# Patient Record
Sex: FEMALE | Race: WHITE | NOT HISPANIC OR LATINO | Employment: FULL TIME | RURAL
[De-identification: names, ages, dates, MRNs, and addresses within clinical notes are randomized per-mention and may not be internally consistent; named-entity substitution may affect disease eponyms.]

---

## 2022-01-05 ENCOUNTER — OFFICE VISIT (OUTPATIENT)
Dept: PRIMARY CARE CLINIC | Facility: CLINIC | Age: 22
End: 2022-01-05
Payer: COMMERCIAL

## 2022-01-05 VITALS
HEART RATE: 100 BPM | TEMPERATURE: 98 F | DIASTOLIC BLOOD PRESSURE: 80 MMHG | RESPIRATION RATE: 18 BRPM | OXYGEN SATURATION: 98 % | SYSTOLIC BLOOD PRESSURE: 130 MMHG

## 2022-01-05 DIAGNOSIS — R68.83 CHILLS: ICD-10-CM

## 2022-01-05 DIAGNOSIS — J32.9 SINUSITIS, UNSPECIFIED CHRONICITY, UNSPECIFIED LOCATION: Primary | ICD-10-CM

## 2022-01-05 DIAGNOSIS — R05.9 COUGH: ICD-10-CM

## 2022-01-05 DIAGNOSIS — R51.9 NONINTRACTABLE HEADACHE, UNSPECIFIED CHRONICITY PATTERN, UNSPECIFIED HEADACHE TYPE: ICD-10-CM

## 2022-01-05 DIAGNOSIS — R52 BODY ACHES: ICD-10-CM

## 2022-01-05 DIAGNOSIS — R50.9 FEVER, UNSPECIFIED FEVER CAUSE: ICD-10-CM

## 2022-01-05 DIAGNOSIS — J06.9 UPPER RESPIRATORY TRACT INFECTION, UNSPECIFIED TYPE: ICD-10-CM

## 2022-01-05 LAB
CTP QC/QA: YES
FLUAV AG NPH QL: NEGATIVE
FLUBV AG NPH QL: NEGATIVE
SARS-COV-2 AG RESP QL IA.RAPID: NEGATIVE

## 2022-01-05 PROCEDURE — 3079F DIAST BP 80-89 MM HG: CPT | Mod: CPTII,,, | Performed by: NURSE PRACTITIONER

## 2022-01-05 PROCEDURE — 1160F PR REVIEW ALL MEDS BY PRESCRIBER/CLIN PHARMACIST DOCUMENTED: ICD-10-PCS | Mod: CPTII,,, | Performed by: NURSE PRACTITIONER

## 2022-01-05 PROCEDURE — 3075F SYST BP GE 130 - 139MM HG: CPT | Mod: CPTII,,, | Performed by: NURSE PRACTITIONER

## 2022-01-05 PROCEDURE — 99203 PR OFFICE/OUTPT VISIT, NEW, LEVL III, 30-44 MIN: ICD-10-PCS | Mod: ,,, | Performed by: NURSE PRACTITIONER

## 2022-01-05 PROCEDURE — 1159F PR MEDICATION LIST DOCUMENTED IN MEDICAL RECORD: ICD-10-PCS | Mod: CPTII,,, | Performed by: NURSE PRACTITIONER

## 2022-01-05 PROCEDURE — 87428 SARSCOV & INF VIR A&B AG IA: CPT | Mod: QW,,, | Performed by: NURSE PRACTITIONER

## 2022-01-05 PROCEDURE — 3075F PR MOST RECENT SYSTOLIC BLOOD PRESS GE 130-139MM HG: ICD-10-PCS | Mod: CPTII,,, | Performed by: NURSE PRACTITIONER

## 2022-01-05 PROCEDURE — 87428 POCT SARS-COV2 (COVID) WITH FLU ANTIGEN: ICD-10-PCS | Mod: QW,,, | Performed by: NURSE PRACTITIONER

## 2022-01-05 PROCEDURE — 99203 OFFICE O/P NEW LOW 30 MIN: CPT | Mod: ,,, | Performed by: NURSE PRACTITIONER

## 2022-01-05 PROCEDURE — 1160F RVW MEDS BY RX/DR IN RCRD: CPT | Mod: CPTII,,, | Performed by: NURSE PRACTITIONER

## 2022-01-05 PROCEDURE — 1159F MED LIST DOCD IN RCRD: CPT | Mod: CPTII,,, | Performed by: NURSE PRACTITIONER

## 2022-01-05 PROCEDURE — 3079F PR MOST RECENT DIASTOLIC BLOOD PRESSURE 80-89 MM HG: ICD-10-PCS | Mod: CPTII,,, | Performed by: NURSE PRACTITIONER

## 2022-01-05 RX ORDER — AMOXICILLIN 500 MG/1
500 CAPSULE ORAL EVERY 12 HOURS
Qty: 20 CAPSULE | Refills: 0 | Status: SHIPPED | OUTPATIENT
Start: 2022-01-05 | End: 2022-01-15

## 2022-01-05 RX ORDER — LORATADINE 10 MG/1
10 TABLET ORAL DAILY PRN
Qty: 30 TABLET | Refills: 0 | Status: SHIPPED | OUTPATIENT
Start: 2022-01-05 | End: 2023-10-11 | Stop reason: CLARIF

## 2022-01-05 RX ORDER — GUAIFENESIN/DEXTROMETHORPHAN 100-10MG/5
10 SYRUP ORAL EVERY 6 HOURS PRN
Qty: 120 ML | Refills: 1 | Status: SHIPPED | OUTPATIENT
Start: 2022-01-05 | End: 2024-02-15

## 2022-01-05 NOTE — PROGRESS NOTES
[facility]  Primary Care       PATIENT NAME: Cristin Webber   : 2000    AGE: 21 y.o. DATE: 2022    MRN: [id number]        Reason for Visit / Chief Complaint:  Headache, Cough, Fever, Fatigue, Generalized Body Aches, Chills, and sneezing     Subjective:     HPI: Patient complains of headache, body aches, chills, cough, sneezing, fever, runny nose; states symptoms started yesterday. Patient states she is 15 weeks pregnant; states she sees Sharda Olson CNM at [facility]         Review of Systems: Review of Systems   Constitutional: Positive for chills and fever. Negative for fatigue.   HENT: Positive for sneezing.    Respiratory: Negative for cough, chest tightness and shortness of breath.    Cardiovascular: Negative for chest pain.   Gastrointestinal: Negative for abdominal pain, constipation, diarrhea, nausea and vomiting.   Genitourinary: Negative for dysuria.   Musculoskeletal: Positive for myalgias. Negative for gait problem.   Skin: Negative for rash.   Neurological: Positive for headaches.          Review of patient's allergies indicates:   Allergen Reactions    Oxycodone         Med List:  No current outpatient medications on file prior to visit.     No current facility-administered medications on file prior to visit.       Medical/Social/Family History:  Past Medical History:   Diagnosis Date    Hypertension       Social History     Tobacco Use   Smoking Status Never Smoker   Smokeless Tobacco Never Used      Social History     Substance and Sexual Activity   Alcohol Use Not Currently       History reviewed. No pertinent family history.   History reviewed. No pertinent surgical history.     There is no immunization history on file for this patient.       Objective:      Vitals:    22 1643   BP: 130/80   BP Location: Right arm   Patient Position: Sitting   BP Method: Large (Manual)   Pulse: 100   Resp: 18   Temp: 98.4 °F (36.9 °C)   TempSrc: Oral   SpO2: 98%     There  is no height or weight on file to calculate BMI.     Physical Exam: Physical Exam  Vitals and nursing note reviewed.   Constitutional:       Appearance: Normal appearance.   HENT:      Head: Normocephalic.      Mouth/Throat:      Mouth: Mucous membranes are moist.   Eyes:      Extraocular Movements: Extraocular movements intact.      Conjunctiva/sclera: Conjunctivae normal.      Pupils: Pupils are equal, round, and reactive to light.   Cardiovascular:      Rate and Rhythm: Normal rate and regular rhythm.      Heart sounds: Normal heart sounds.   Pulmonary:      Effort: Pulmonary effort is normal. No respiratory distress.      Breath sounds: Normal breath sounds. No wheezing or rhonchi.   Abdominal:      General: Bowel sounds are normal.      Palpations: Abdomen is soft.   Musculoskeletal:         General: Normal range of motion.      Cervical back: Normal range of motion.   Skin:     General: Skin is warm and dry.   Neurological:      General: No focal deficit present.      Mental Status: She is alert and oriented to person, place, and time.      Gait: Gait normal.   Psychiatric:         Mood and Affect: Mood normal.         Behavior: Behavior normal.              Component      Latest Ref Rng & Units 1/5/2022   SARS Coronavirus 2 Antigen      Negative Negative   Rapid Influenza A Ag      Negative Negative   Rapid Influenza B Ag      Negative Negative    Acceptable       Yes     Assessment:          ICD-10-CM ICD-9-CM   1. Sinusitis, unspecified chronicity, unspecified location  J32.9 473.9   2. Nonintractable headache, unspecified chronicity pattern, unspecified headache type  R51.9 784.0   3. Body aches  R52 780.96   4. Fever, unspecified fever cause  R50.9 780.60   5. Cough  R05.9 786.2   6. Chills  R68.83 780.64   7. Upper respiratory tract infection, unspecified type  J06.9 465.9        Plan:       Sinusitis, unspecified chronicity, unspecified location  -     amoxicillin (AMOXIL) 500 MG capsule;  Take 1 capsule (500 mg total) by mouth every 12 (twelve) hours. for 10 days  Dispense: 20 capsule; Refill: 0  -     loratadine (CLARITIN) 10 mg tablet; Take 1 tablet (10 mg total) by mouth daily as needed for Allergies.  Dispense: 30 tablet; Refill: 0    Nonintractable headache, unspecified chronicity pattern, unspecified headache type  -     POCT SARS-COV2 (COVID) with Flu Antigen    Body aches  -     POCT SARS-COV2 (COVID) with Flu Antigen    Fever, unspecified fever cause  -     POCT SARS-COV2 (COVID) with Flu Antigen    Cough  -     POCT SARS-COV2 (COVID) with Flu Antigen  -     dextromethorphan-guaiFENesin  mg/5 ml (ROBITUSSIN-DM)  mg/5 mL liquid; Take 10 mLs by mouth every 6 (six) hours as needed (cough and congestion).  Dispense: 120 mL; Refill: 1    Chills  -     POCT SARS-COV2 (COVID) with Flu Antigen    Upper respiratory tract infection, unspecified type          New & refilled meds:  Requested Prescriptions     Signed Prescriptions Disp Refills    amoxicillin (AMOXIL) 500 MG capsule 20 capsule 0     Sig: Take 1 capsule (500 mg total) by mouth every 12 (twelve) hours. for 10 days    loratadine (CLARITIN) 10 mg tablet 30 tablet 0     Sig: Take 1 tablet (10 mg total) by mouth daily as needed for Allergies.    dextromethorphan-guaiFENesin  mg/5 ml (ROBITUSSIN-DM)  mg/5 mL liquid 120 mL 1     Sig: Take 10 mLs by mouth every 6 (six) hours as needed (cough and congestion).       Follow up if symptoms worsen or fail to improve.     Patient Instructions   Patient Education       Sinusitis in Adults   The Basics   Written by the doctors and editors at [facility]   What is sinusitis? -- Sinusitis is a condition that can cause a stuffy nose, pain in the face, and discharge (mucus) from the nose. The sinuses are hollow areas in the bones of the face (figure 1). They have a thin lining that normally makes a small amount of mucus. When this lining gets irritated or infected, it swells and makes  extra mucus. This causes symptoms.  Sinusitis can occur when a person gets sick with a cold. The germs causing the cold can also infect the sinuses. Many times, a person feels like their cold is getting better. But then they get sinusitis and begin to feel sick again.  What are the symptoms of sinusitis? -- Common symptoms of sinusitis include:  · Stuffy or blocked nose  · Thick white, yellow, or green discharge from the nose  · Pain in the teeth  · Pain or pressure in the face - This often feels worse when a person bends forward.  People with sinusitis can also have other symptoms that include:  · Fever  · Cough  · Trouble smelling  · Ear pressure or fullness  · Headache  · Bad breath  · Feeling tired  Most of the time, symptoms start to improve in 7 to 10 days.  Should I see a doctor or nurse? -- See your doctor or nurse if your symptoms last more than 10 days, or if your symptoms first get better but then get worse.  Rarely, sinusitis can lead to serious problems. See your doctor or nurse right away (do not wait 10 days) if you have:  · Fever higher than 102°F (38.9°C)  · Sudden and severe pain in the face and head  · Trouble seeing or seeing double  · Trouble thinking clearly  · Swelling or redness around one or both eyes  · A stiff neck  Is there anything I can do on my own to feel better? -- Yes. To reduce your symptoms, you can:  · Take an over-the-counter pain reliever to reduce the pain  · Rinse your nose and sinuses with salt water a few times a day - Ask your doctor or nurse about the best way to do this.  Your doctor might also prescribe a steroid nose spray to reduce the swelling in your nose. (These kinds of steroid nose sprays are safe to take, and do not contain the same steroids that some athletes take illegally.)  How is sinusitis treated? -- Most of the time, sinusitis does not need to be treated with antibiotic medicines. This is because most sinusitis is caused by viruses, not bacteria, and  "antibiotics do not kill viruses. In fact, even sinusitis caused by bacteria will usually get better on its own without antibiotics.   Some people with sinusitis do need treatment with antibiotics. If your symptoms have not improved after 10 days, ask your doctor if you should take antibiotics. Your doctor might recommend that you wait 1 more week to see if your symptoms improve. But if you have symptoms such as a fever or a lot of pain, they might prescribe antibiotics. It is important to follow your doctor's instructions about taking your antibiotics.  What if my symptoms do not get better? -- If your symptoms do not get better, talk with your doctor or nurse. They might order tests to figure out why you still have symptoms. These can include:  · CT scan or other imaging tests - Imaging tests create pictures of the inside of the body.  · A test to look inside the sinuses - For this test, a doctor puts a thin tube with a camera on the end into the nose and up into the sinuses.  Some people get a lot of sinus infections or have symptoms that last at least 3 months. These people can have a different type of sinusitis called "chronic sinusitis." Chronic sinusitis can be caused by different things. For example, some people have growths inside their nose or sinuses that are called "polyps." Other people have allergies that cause their symptoms.  Chronic sinusitis can be treated in different ways. If you have chronic sinusitis, talk with your doctor about which treatments are right for you.  All topics are updated as new evidence becomes available and our peer review process is complete.  This topic retrieved from [email or web address] on: Sep 21, 2021.  Topic 30108 Version 17.0  Release: 29.4.2 - C29.263  © 2021 UpToDate, Inc. and/or its affiliates. All rights reserved.  figure 1: Sinuses of the face     This drawing shows the sinuses of the face, from the side and front views.  Graphic 089258 Version 1.0    Consumer Information Use " and Disclaimer   This information is not specific medical advice and does not replace information you receive from your health care provider. This is only a brief summary of general information. It does NOT include all information about conditions, illnesses, injuries, tests, procedures, treatments, therapies, discharge instructions or life-style choices that may apply to you. You must talk with your health care provider for complete information about your health and treatment options. This information should not be used to decide whether or not to accept your health care provider's advice, instructions or recommendations. Only your health care provider has the knowledge and training to provide advice that is right for you. The use of this information is governed by the [employer] End User License Agreement, available at https://www.[employer]rell[employer]/en/solutions/[employer]/about/valerie.The use of test company content is governed by the test company Terms of Use. ©2021 UpToDate, Inc. All rights reserved.  Copyright   © 2021 UpToDate, Inc. and/or its affiliates. All rights reserved.           Signature: Calos Devries DNP, FNP-C

## 2022-01-05 NOTE — PATIENT INSTRUCTIONS
Patient Education       Sinusitis in Adults   The Basics   Written by the doctors and editors at [facility]   What is sinusitis? -- Sinusitis is a condition that can cause a stuffy nose, pain in the face, and discharge (mucus) from the nose. The sinuses are hollow areas in the bones of the face (figure 1). They have a thin lining that normally makes a small amount of mucus. When this lining gets irritated or infected, it swells and makes extra mucus. This causes symptoms.  Sinusitis can occur when a person gets sick with a cold. The germs causing the cold can also infect the sinuses. Many times, a person feels like their cold is getting better. But then they get sinusitis and begin to feel sick again.  What are the symptoms of sinusitis? -- Common symptoms of sinusitis include:  · Stuffy or blocked nose  · Thick white, yellow, or green discharge from the nose  · Pain in the teeth  · Pain or pressure in the face - This often feels worse when a person bends forward.  People with sinusitis can also have other symptoms that include:  · Fever  · Cough  · Trouble smelling  · Ear pressure or fullness  · Headache  · Bad breath  · Feeling tired  Most of the time, symptoms start to improve in 7 to 10 days.  Should I see a doctor or nurse? -- See your doctor or nurse if your symptoms last more than 10 days, or if your symptoms first get better but then get worse.  Rarely, sinusitis can lead to serious problems. See your doctor or nurse right away (do not wait 10 days) if you have:  · Fever higher than 102°F (38.9°C)  · Sudden and severe pain in the face and head  · Trouble seeing or seeing double  · Trouble thinking clearly  · Swelling or redness around one or both eyes  · A stiff neck  Is there anything I can do on my own to feel better? -- Yes. To reduce your symptoms, you can:  · Take an over-the-counter pain reliever to reduce the pain  · Rinse your nose and sinuses with salt water a few times a day - Ask your doctor or  "nurse about the best way to do this.  Your doctor might also prescribe a steroid nose spray to reduce the swelling in your nose. (These kinds of steroid nose sprays are safe to take, and do not contain the same steroids that some athletes take illegally.)  How is sinusitis treated? -- Most of the time, sinusitis does not need to be treated with antibiotic medicines. This is because most sinusitis is caused by viruses, not bacteria, and antibiotics do not kill viruses. In fact, even sinusitis caused by bacteria will usually get better on its own without antibiotics.   Some people with sinusitis do need treatment with antibiotics. If your symptoms have not improved after 10 days, ask your doctor if you should take antibiotics. Your doctor might recommend that you wait 1 more week to see if your symptoms improve. But if you have symptoms such as a fever or a lot of pain, they might prescribe antibiotics. It is important to follow your doctor's instructions about taking your antibiotics.  What if my symptoms do not get better? -- If your symptoms do not get better, talk with your doctor or nurse. They might order tests to figure out why you still have symptoms. These can include:  · CT scan or other imaging tests - Imaging tests create pictures of the inside of the body.  · A test to look inside the sinuses - For this test, a doctor puts a thin tube with a camera on the end into the nose and up into the sinuses.  Some people get a lot of sinus infections or have symptoms that last at least 3 months. These people can have a different type of sinusitis called "chronic sinusitis." Chronic sinusitis can be caused by different things. For example, some people have growths inside their nose or sinuses that are called "polyps." Other people have allergies that cause their symptoms.  Chronic sinusitis can be treated in different ways. If you have chronic sinusitis, talk with your doctor about which treatments are right for " you.  All topics are updated as new evidence becomes available and our peer review process is complete.  This topic retrieved from [employer] on: Sep 21, 2021.  Topic 71992 Version 17.0  Release: 29.4.2 - C29.263  © 2021 UpToDate, Inc. and/or its affiliates. All rights reserved.  figure 1: Sinuses of the face     This drawing shows the sinuses of the face, from the side and front views.  Graphic 877490 Version 1.0    Consumer Information Use and Disclaimer   This information is not specific medical advice and does not replace information you receive from your health care provider. This is only a brief summary of general information. It does NOT include all information about conditions, illnesses, injuries, tests, procedures, treatments, therapies, discharge instructions or life-style choices that may apply to you. You must talk with your health care provider for complete information about your health and treatment options. This information should not be used to decide whether or not to accept your health care provider's advice, instructions or recommendations. Only your health care provider has the knowledge and training to provide advice that is right for you. The use of this information is governed by the [employer] End User License Agreement, available at https://www.[employer]/en/solutions/[employer]/about/valerie.The use of [employer] content is governed by the [employer] Terms of Use. ©2021 UpToDate, Inc. All rights reserved.  Copyright   © 2021 UpToDate, Inc. and/or its affiliates. All rights reserved.

## 2022-01-26 ENCOUNTER — OFFICE VISIT (OUTPATIENT)
Dept: PRIMARY CARE CLINIC | Facility: CLINIC | Age: 22
End: 2022-01-26
Payer: COMMERCIAL

## 2022-01-26 VITALS
SYSTOLIC BLOOD PRESSURE: 108 MMHG | BODY MASS INDEX: 29.62 KG/M2 | TEMPERATURE: 98 F | RESPIRATION RATE: 18 BRPM | WEIGHT: 200 LBS | OXYGEN SATURATION: 98 % | HEART RATE: 104 BPM | HEIGHT: 69 IN | DIASTOLIC BLOOD PRESSURE: 76 MMHG

## 2022-01-26 DIAGNOSIS — R11.10 VOMITING, INTRACTABILITY OF VOMITING NOT SPECIFIED, PRESENCE OF NAUSEA NOT SPECIFIED, UNSPECIFIED VOMITING TYPE: ICD-10-CM

## 2022-01-26 DIAGNOSIS — J02.9 SORE THROAT: ICD-10-CM

## 2022-01-26 DIAGNOSIS — U07.1 COVID-19 VIRUS INFECTION: Primary | ICD-10-CM

## 2022-01-26 DIAGNOSIS — J32.9 SINUSITIS, UNSPECIFIED CHRONICITY, UNSPECIFIED LOCATION: ICD-10-CM

## 2022-01-26 DIAGNOSIS — R51.9 NONINTRACTABLE HEADACHE, UNSPECIFIED CHRONICITY PATTERN, UNSPECIFIED HEADACHE TYPE: ICD-10-CM

## 2022-01-26 DIAGNOSIS — R09.81 NASAL CONGESTION: ICD-10-CM

## 2022-01-26 DIAGNOSIS — R05.9 COUGH: ICD-10-CM

## 2022-01-26 DIAGNOSIS — R68.83 CHILLS: ICD-10-CM

## 2022-01-26 DIAGNOSIS — Z3A.17 17 WEEKS GESTATION OF PREGNANCY: ICD-10-CM

## 2022-01-26 LAB
CTP QC/QA: YES
CTP QC/QA: YES
S PYO RRNA THROAT QL PROBE: NEGATIVE
SARS-COV-2 AG RESP QL IA.RAPID: POSITIVE

## 2022-01-26 PROCEDURE — 3074F PR MOST RECENT SYSTOLIC BLOOD PRESSURE < 130 MM HG: ICD-10-PCS | Mod: CPTII,,, | Performed by: NURSE PRACTITIONER

## 2022-01-26 PROCEDURE — 1159F PR MEDICATION LIST DOCUMENTED IN MEDICAL RECORD: ICD-10-PCS | Mod: CPTII,,, | Performed by: NURSE PRACTITIONER

## 2022-01-26 PROCEDURE — 1159F MED LIST DOCD IN RCRD: CPT | Mod: CPTII,,, | Performed by: NURSE PRACTITIONER

## 2022-01-26 PROCEDURE — 3008F BODY MASS INDEX DOCD: CPT | Mod: CPTII,,, | Performed by: NURSE PRACTITIONER

## 2022-01-26 PROCEDURE — 3078F PR MOST RECENT DIASTOLIC BLOOD PRESSURE < 80 MM HG: ICD-10-PCS | Mod: CPTII,,, | Performed by: NURSE PRACTITIONER

## 2022-01-26 PROCEDURE — 99213 OFFICE O/P EST LOW 20 MIN: CPT | Mod: ,,, | Performed by: NURSE PRACTITIONER

## 2022-01-26 PROCEDURE — 87426 SARSCOV CORONAVIRUS AG IA: CPT | Mod: QW,,, | Performed by: NURSE PRACTITIONER

## 2022-01-26 PROCEDURE — 3074F SYST BP LT 130 MM HG: CPT | Mod: CPTII,,, | Performed by: NURSE PRACTITIONER

## 2022-01-26 PROCEDURE — 3078F DIAST BP <80 MM HG: CPT | Mod: CPTII,,, | Performed by: NURSE PRACTITIONER

## 2022-01-26 PROCEDURE — 1160F RVW MEDS BY RX/DR IN RCRD: CPT | Mod: CPTII,,, | Performed by: NURSE PRACTITIONER

## 2022-01-26 PROCEDURE — 87880 POCT RAPID STREP A: ICD-10-PCS | Mod: QW,,, | Performed by: NURSE PRACTITIONER

## 2022-01-26 PROCEDURE — 87426 SARS CORONAVIRUS 2 ANTIGEN POCT: ICD-10-PCS | Mod: QW,,, | Performed by: NURSE PRACTITIONER

## 2022-01-26 PROCEDURE — 1160F PR REVIEW ALL MEDS BY PRESCRIBER/CLIN PHARMACIST DOCUMENTED: ICD-10-PCS | Mod: CPTII,,, | Performed by: NURSE PRACTITIONER

## 2022-01-26 PROCEDURE — 87880 STREP A ASSAY W/OPTIC: CPT | Mod: QW,,, | Performed by: NURSE PRACTITIONER

## 2022-01-26 PROCEDURE — 3008F PR BODY MASS INDEX (BMI) DOCUMENTED: ICD-10-PCS | Mod: CPTII,,, | Performed by: NURSE PRACTITIONER

## 2022-01-26 PROCEDURE — 99213 PR OFFICE/OUTPT VISIT, EST, LEVL III, 20-29 MIN: ICD-10-PCS | Mod: ,,, | Performed by: NURSE PRACTITIONER

## 2022-01-26 RX ORDER — CHOLECALCIFEROL (VITAMIN D3) 50 MCG
1 TABLET ORAL DAILY
Qty: 14 TABLET | Refills: 0 | Status: SHIPPED | OUTPATIENT
Start: 2022-01-26 | End: 2022-02-09

## 2022-01-26 RX ORDER — ONDANSETRON 4 MG/1
4 TABLET, FILM COATED ORAL EVERY 8 HOURS PRN
Qty: 30 TABLET | Refills: 0 | Status: SHIPPED | OUTPATIENT
Start: 2022-01-26 | End: 2022-08-24

## 2022-01-26 RX ORDER — FAMOTIDINE 20 MG/1
20 TABLET, FILM COATED ORAL 2 TIMES DAILY
Qty: 28 TABLET | Refills: 0 | Status: SHIPPED | OUTPATIENT
Start: 2022-01-26 | End: 2023-10-11 | Stop reason: ALTCHOICE

## 2022-01-26 RX ORDER — LANOLIN ALCOHOL/MO/W.PET/CERES
500 CREAM (GRAM) TOPICAL
Qty: 42 CAPSULE | Refills: 0 | Status: SHIPPED | OUTPATIENT
Start: 2022-01-26 | End: 2022-02-09

## 2022-01-26 RX ORDER — AMOXICILLIN 500 MG/1
500 CAPSULE ORAL EVERY 12 HOURS
Qty: 20 CAPSULE | Refills: 0 | Status: SHIPPED | OUTPATIENT
Start: 2022-01-26 | End: 2022-02-05

## 2022-01-26 RX ORDER — CETIRIZINE HYDROCHLORIDE 10 MG/1
10 TABLET ORAL DAILY
Qty: 30 TABLET | Refills: 0 | Status: SHIPPED | OUTPATIENT
Start: 2022-01-26 | End: 2022-08-24 | Stop reason: SDUPTHER

## 2022-01-26 NOTE — PATIENT INSTRUCTIONS
"Patient Education       COVID-19 Overview   The Basics   Written by the doctors and editors at [employer]   View in ItalianView in Latvian PortugueseView in GermanView in JapaneseView in FrenchView in SpanishView video in Romanian   What is COVID-19?   COVID-19 stands for "coronavirus disease 2019." It is caused by a virus called SARS-CoV-2. The virus first appeared in late 2019 and quickly spread around the world.  What are the symptoms of COVID-19?   Symptoms usually start 4 or 5 days after a person is infected with the virus. But in some people, it can take up to 2 weeks for symptoms to appear. Some people never show symptoms at all.  When symptoms do happen, they can include:  · Fever  · Cough  · Trouble breathing  · Feeling tired  · Shaking chills  · Muscle aches  · Headache  · Sore throat  · Problems with sense of smell or taste  Some people have digestive problems like nausea or diarrhea. There have also been some reports of rashes or other skin symptoms. For example, some people with COVID-19 get reddish-purple spots on their fingers or toes. But it's not clear why or how often this happens.  For most people, symptoms will get better within a few weeks. But a small number of people get very sick and stop being able to breathe on their own. In severe cases, their organs stop working, which can lead to death.  Some people with COVID-19 continue to have some symptoms for weeks or months. This seems to be more likely in people who are sick enough to need to stay in the hospital. But this can also happen in people who did not get very sick. Doctors are still learning about the long-term effects of COVID-19.  While children can get COVID-19, they are less likely than adults to have severe symptoms. More information about COVID-19 and children is available separately. (See "Patient education: COVID-19 and children (The Basics)".)  Am I at risk for getting seriously ill?   It depends on your age and health. In some " "people, COVID-19 leads to serious problems like pneumonia, not getting enough oxygen, heart problems, or even death. This risk gets higher as people get older. It is also higher in people who have other health problems like serious heart disease, chronic kidney disease, type 2 diabetes, chronic obstructive pulmonary disease (COPD), sickle cell disease, or obesity. People who have a weak immune system for other reasons (for example, HIV infection or certain medicines), asthma, cystic fibrosis, type 1 diabetes, or high blood pressure might also be at higher risk for serious problems.  How is COVID-19 spread?   The virus that causes COVID-19 mainly spreads from person to person. This usually happens when an infected person coughs, sneezes, or talks near other people. The virus is passed through tiny particles from the infected person's lungs and airway. These particles can easily travel through the air to other people who are nearby. In some cases, like in indoor spaces where the same air keeps being blown around, virus in the particles might be able to spread to other people who are farther away.  The virus can be passed easily between people who live together. But it can also spread at gatherings where people are talking close together, shaking hands, hugging, sharing food, or even singing together. Eating at restaurants raises the risk of infection, since people tend to be close to each other and not covering their faces. Doctors also think it is possible to get infected if you touch a surface that has the virus on it and then touch your mouth, nose, or eyes. However, this is probably not very common.  A person can be infected, and spread the virus to others, even without having any symptoms.  Are there different variants of the virus that causes COVID-19?   Yes. Viruses constantly change or "mutate." When this happens, a new strain or "variant" can form. Most of the time, new variants do not change the way a virus " "works. But when a variant has changes in important parts of the virus, it can act differently.  Experts have discovered several new variants of the virus that causes COVID-19. Certain variants seem to spread more easily than the original virus. They might also make people sicker.  Experts are studying the different variants. This will help them better understand how far they have spread, whether they affect people differently, and how well different vaccines protect against them.  The more people who get vaccinated against COVID-19, the harder it will be for the virus to form new variants.  Is there a test for the virus that causes COVID-19?   Yes. If your doctor or nurse suspects you have COVID-19, they might take a swab from inside your nose or mouth for testing. In some cases, they might take a sample of your saliva. These tests can help your doctor figure out if you have COVID-19 or another illness.  There are 2 types of tests used to diagnose COVID-19:  · Molecular tests - These look for the genetic material from the virus. They are also called "nucleic acid tests." You can get a molecular test at a doctor's office, clinic, or pharmacy. There are also places that make these tests available for lots of people, often at drive-through locations. Depending on the lab, it can take up to several days to get test results back.  Molecular tests are the best way to know if a person has COVID-19. That's because they can detect even very low levels of virus in the body.  · Antigen tests - These look for proteins from the virus. They can give results faster than most molecular tests. You can do an antigen test at a doctor's office, clinic, pharmacy, or through some organizations that make testing available in other places. You can also do an antigen test at home.  Antigen tests are not as accurate as molecular tests. They are more likely to give "false negative" results. This is when the test comes back negative even " "though the person actually is infected. But antigen tests can still be useful in some situations, when results are needed quickly or a molecular test is not available. For example, if a person has early symptoms of COVID-19, an antigen test can be accurate enough to detect virus in their body. If a person gets an antigen test and the result is negative, a molecular test might be needed to confirm they do not have the virus in their body. This might be done if the person has symptoms or knows they were exposed the virus.  There is also a blood test that can show if a person has had COVID-19 in the past. This is called an "antibody" test. Antibody tests are generally not used on their own to diagnose COVID-19 or make decisions about care. But experts can use them to learn how many people in a certain area were infected without knowing it.  Can COVID-19 be prevented?   The best way to prevent COVID-19 is to get vaccinated. In the [place], the first vaccines became available in late 2020. People age 12 and older can get a vaccine.  If enough people get the vaccine, the virus will stop spreading so quickly. More information about COVID-19 vaccines, including what you can do after being vaccinated, is available separately. (See "Patient education: COVID-19 vaccines (The Basics)".)  Experts believe that vaccines will be one of the most important ways to control the COVID-19 pandemic. People who are fully vaccinated are at much lower risk of getting the virus.  If you are not yet vaccinated, there are other ways to help protect yourself and others:  · Practice "social distancing." It's most important to avoid contact with people who are sick. But social distancing also means staying at least 6 feet (about 2 meters) from anyone outside your household. That's because the virus can spread easily through close contact, and it's not always possible to know who is infected.  · Wear a face mask when you need to go be in " public around other people. This is mostly so that if you are infected, even if you don't have any symptoms, you are less likely to spread the infection to other people. It might also help protect you from others who could be infected. Make sure your mask covers your mouth and nose.  You can buy cloth masks and disposable (non-medical) masks in stores or online. Cloth masks work best if they have several layers of fabric. Your mask should fit snugly over your face with no gaps. You can improve the fit by using a mask with an adjustable nose wire, adjusting or knotting the ear loops to make it tighter, or wearing a cloth mask on top of a disposable mask.  When you take your mask off, make sure you do not touch your eyes, nose, or mouth. And wash your hands after you touch the mask. You can wash cloth masks with the rest of your laundry.  When you are outdoors and not around other people, you might not need to wear a mask. But it's important to know what the rules are in your area. The [place] Centers for Disease Control and Prevention (CDC) has more information about how to wear a face mask: www.cdc.gov/coronavirus/2019-ncov/prevent-getting-sick/about-face-coverings.html.  · Wash your hands with soap and water often. This is especially important after being out in public or touching surfaces that many other people also touch, like door handles or railings. The risk of getting infected by touching items like this is probably not very high. Still, it's a good idea to wash your hands often. This also helps protect you from other illnesses, like the flu or the common cold.  Make sure to rub your hands with soap for at least 20 seconds, cleaning your wrists, fingernails, and in between your fingers. Then rinse your hands and dry them with a paper towel you can throw away. If you are not near a sink, you can use a hand sanitizing gel to clean your hands. The gels with at least 60 percent alcohol work the best. But it  "is better to wash with soap and water if you can.  · Avoid touching your face, especially your mouth, nose, and eyes.  · Avoid or limit traveling if you can. Any form of travel, especially if you spend time in crowded places like airports, increases your risk of getting and spreading infection.  If you do need to travel, be sure to check whether there are any rules about COVID-19 in the area you are visiting. In the [place], some places require people to "self-quarantine" for some length of time if they are visiting (or returning) from another state. This means not going out in public or being around other people. The [place] also requires a negative COVID-19 test for anyone who enters, or returns to, the country. Many other countries have testing requirements for visiting, too. All of these rules are meant to help slow the spread of COVID-19.  Once you are fully vaccinated, you are much less likely to get the virus. "Fully vaccinated" means you have had all doses of the vaccine and it has been at least 2 weeks since the last dose. (If you had a single-dose vaccine, you are fully vaccinated 2 weeks after you get the shot.)  What should I do if I have symptoms?   If you have a fever, cough, trouble breathing, or other symptoms of COVID-19, call your doctor or nurse. They will ask about your symptoms. They might also ask about any recent travel and whether you have been around anyone who might have been infected. Then they can tell you if you should come in or go somewhere else to be tested.  If your symptoms are not severe, it is best to call before you go in. The staff can tell you what to do and whether you need to be seen in person. Many people with only mild symptoms should stay home and avoid other people until they get better. If you do need to go to the clinic or hospital, be sure to wear a mask. This helps protect other people. The staff might also have you wait someplace away from other " people.  If you are severely ill and need to go to the clinic or hospital right away, you should still call ahead if possible. This way the staff can care for you while taking steps to protect others. If you think you are having a medical emergency, call for an ambulance (in the [place] and [place], dial [email or web address]).  What if I feel fine but think I was exposed?   If you think you were in close contact with someone with COVID-19, what to do next depends on whether you have already had COVID-19 or gotten the vaccine:  · If you have not had COVID-19 or gotten the vaccine - You should get tested after a possible exposure, even if you don't have any symptoms. Call your doctor or nurse if you aren't sure where to get a test. Then self-quarantine at home and monitor yourself for symptoms. This means staying home as much as possible, and staying at least 6 feet (2 meters) away from other people in your home.  The safest thing to do after a possible exposure is to self-quarantine for 14 days. This can be challenging with work, school, or other responsibilities. Because of this, some public health departments might allow people to stop quarantining sooner, especially if they get a negative test. If you're not sure how long to quarantine for, contact your local public health office or ask your doctor or nurse.  · If you have had COVID-19 or gotten the vaccine - If you had COVID-19 within the last 3 months, you do not need to self-quarantine. If you had COVID-19 but it was more than 3 months ago, follow the steps above.  If you are fully vaccinated, you do not need to self-quarantine. But you should still get tested 3 to 5 days after you were in contact with the person who had COVID-19. Even though you are much less likely to get the infection after being vaccinated, it is still possible.  If you self-quarantine for less than 14 days, or if you do not need to self-quarantine, you should still monitor yourself for symptoms for the full 14  "days. If you start to have any symptoms, call your doctor or nurse right away. You should also be extra careful about wearing a mask and social distancing during this time.  How is COVID-19 treated?   Many people will be able to stay home while they get better. But people with serious symptoms or other health problems might need to go to the hospital.  · Mild illness - Mild illness means you might have symptoms like fever and cough, but you do not have trouble breathing. Most people with COVID-19 have mild illness and can rest at home until they get better. This usually takes about 2 weeks, but it's not the same for everyone.  If you are recovering from COVID-19, it's important to stay home and "self-isolate" until your doctor or nurse tells you it's safe to stop. Self-isolation means staying apart from other people, even the people you live with. When you can stop self-isolation will depend on how long it has been since you had symptoms, and in some cases, whether you have had a negative test (showing that the virus is no longer in your body).  · Severe illness - If you have more severe illness with trouble breathing, you might need to stay in the hospital, possibly in the intensive care unit (also called the "ICU"). While you are there, you will most likely be in a special isolation room. Only medical staff will be allowed in the room, and they will have to wear special gowns, gloves, masks, and eye protection.  The doctors and nurses can monitor and support your breathing and other body functions and make you as comfortable as possible. You might need extra oxygen to help you breathe easily. If you are having a very hard time breathing, you might need a breathing tube. The tube goes down your throat and into your lungs. It is connected to a machine to help you breathe, called a "ventilator."  Doctors are studying several possible treatments for COVID-19. In certain cases, they might recommend treatments called " ""monoclonal antibodies." These treatments seem to help some people who are at risk of getting severely ill.  Doctors also might recommend being part of a clinical trial. A clinical trial is a scientific study that tests new medicines to see how well they work. Do not try any new medicines or treatments without talking to a doctor.  What should I do if someone in my home has COVID-19?   If someone in your home has COVID-19, there are additional things you can do to protect yourself and others:  · Keep the sick person away from others - The sick person should stay in a separate room, and use a different bathroom if possible. They should also eat in their own room.  Experts also recommend that the person stay away from pets in the house until they are better.  · Have them wear a mask - The sick person should wear a mask when they are in the same room as other people. If they can't wear a mask, you can help protect yourself by covering your face when you are in the room with them.  · Wash hands - Wash your hands with soap and water often.  · Clean often - Here are some specific things that can help:  ? Wear disposable gloves when you clean. It's also a good idea to wear gloves when you have to touch the sick person's laundry, dishes, utensils, or trash. Wash your hands after removing your gloves.  ? Regularly clean things that are touched a lot. This includes counters, bedside tables, doorknobs, computers, phones, and bathroom surfaces.  ? Clean things in your home with soap and water, but also use disinfectants on appropriate surfaces. Some cleaning products work well to kill bacteria, but not viruses, so it's important to check labels. The [place] Environmental Protection Agency (EPA) has a list of products here: www.epa.gov/pesticide-registration/list-n-disinfectants-use-against-sars-cov-2.  What if I am pregnant?   More information about COVID-19 and pregnancy is available separately. (See "Patient education: " "COVID-19 and pregnancy (The Basics)".)  If you are pregnant and you have questions about COVID-19, talk to your doctor, nurse, or midwife. They can help.  What can I do to cope with stress and anxiety?   It's normal to feel anxious or worried about COVID-19. It's also normal to feel stressed, lonely, or tired of not being able to do your usual activities. You can take care of yourself by trying to:  · Take breaks from the news  · Get regular exercise and eat healthy foods  · Find activities that you enjoy and can do at home  · Stay in touch with your friends and family members  It might help to remember that by doing things like getting vaccinated and following local guidelines, you are helping to protect other people in your community.  Where can I go to learn more?   As we learn more about this virus, expert recommendations will continue to change. Check with your doctor or public health official to get the most updated information about how to protect yourself and others.  For information about COVID-19 in your area, you can call your local public health office. In the [place], this usually means your city or town's Board of Health. Many states also have a "hotline" phone number you can call.  You can find more information about COVID-19 at the following websites:  · [place] Centers for Disease Control and Prevention (CDC): www.cdc.gov/COVID19  · World Health Organization (WHO): www.who.int/emergencies/diseases/novel-coronavirus-2019  All topics are updated as new evidence becomes available and our peer review process is complete.  This topic retrieved from [employer] on: Oct 28, 2021.  Topic 707436 Version 67.0  Release: 29.4.2 - C29.263  © 2021 UpToDate, Inc[employer] and/or its affiliates. All rights reserved.  Consumer Information Use and Disclaimer   This information is not specific medical advice and does not replace information you receive from your health care provider. This is only a brief summary of " general information. It does NOT include all information about conditions, illnesses, injuries, tests, procedures, treatments, therapies, discharge instructions or life-style choices that may apply to you. You must talk with your health care provider for complete information about your health and treatment options. This information should not be used to decide whether or not to accept your health care provider's advice, instructions or recommendations. Only your health care provider has the knowledge and training to provide advice that is right for you. The use of this information is governed by the [employer] End User License Agreement, available at https://www.[employer]/en/solutions/Better Place/about/valerie.The use of [employer] content is governed by the [employer] Terms of Use. ©2021 UpToDate[employer] Inc. All rights reserved.  Copyright   © 2021 UpToDate, Inc. and/or its affiliates. All rights reserved.  Patient Education       Sinusitis in Adults   The Basics   Written by the doctors and editors at [facility]   What is sinusitis? -- Sinusitis is a condition that can cause a stuffy nose, pain in the face, and discharge (mucus) from the nose. The sinuses are hollow areas in the bones of the face (figure 1). They have a thin lining that normally makes a small amount of mucus. When this lining gets irritated or infected, it swells and makes extra mucus. This causes symptoms.  Sinusitis can occur when a person gets sick with a cold. The germs causing the cold can also infect the sinuses. Many times, a person feels like their cold is getting better. But then they get sinusitis and begin to feel sick again.  What are the symptoms of sinusitis? -- Common symptoms of sinusitis include:  · Stuffy or blocked nose  · Thick white, yellow, or green discharge from the nose  · Pain in the teeth  · Pain or pressure in the face - This often feels worse when a person bends forward.  People with sinusitis can also have other symptoms that  include:  · Fever  · Cough  · Trouble smelling  · Ear pressure or fullness  · Headache  · Bad breath  · Feeling tired  Most of the time, symptoms start to improve in 7 to 10 days.  Should I see a doctor or nurse? -- See your doctor or nurse if your symptoms last more than 10 days, or if your symptoms first get better but then get worse.  Rarely, sinusitis can lead to serious problems. See your doctor or nurse right away (do not wait 10 days) if you have:  · Fever higher than 102°F (38.9°C)  · Sudden and severe pain in the face and head  · Trouble seeing or seeing double  · Trouble thinking clearly  · Swelling or redness around one or both eyes  · A stiff neck  Is there anything I can do on my own to feel better? -- Yes. To reduce your symptoms, you can:  · Take an over-the-counter pain reliever to reduce the pain  · Rinse your nose and sinuses with salt water a few times a day - Ask your doctor or nurse about the best way to do this.  Your doctor might also prescribe a steroid nose spray to reduce the swelling in your nose. (These kinds of steroid nose sprays are safe to take, and do not contain the same steroids that some athletes take illegally.)  How is sinusitis treated? -- Most of the time, sinusitis does not need to be treated with antibiotic medicines. This is because most sinusitis is caused by viruses, not bacteria, and antibiotics do not kill viruses. In fact, even sinusitis caused by bacteria will usually get better on its own without antibiotics.   Some people with sinusitis do need treatment with antibiotics. If your symptoms have not improved after 10 days, ask your doctor if you should take antibiotics. Your doctor might recommend that you wait 1 more week to see if your symptoms improve. But if you have symptoms such as a fever or a lot of pain, they might prescribe antibiotics. It is important to follow your doctor's instructions about taking your antibiotics.  What if my symptoms do not get  "better? -- If your symptoms do not get better, talk with your doctor or nurse. They might order tests to figure out why you still have symptoms. These can include:  · CT scan or other imaging tests - Imaging tests create pictures of the inside of the body.  · A test to look inside the sinuses - For this test, a doctor puts a thin tube with a camera on the end into the nose and up into the sinuses.  Some people get a lot of sinus infections or have symptoms that last at least 3 months. These people can have a different type of sinusitis called "chronic sinusitis." Chronic sinusitis can be caused by different things. For example, some people have growths inside their nose or sinuses that are called "polyps." Other people have allergies that cause their symptoms.  Chronic sinusitis can be treated in different ways. If you have chronic sinusitis, talk with your doctor about which treatments are right for you.  All topics are updated as new evidence becomes available and our peer review process is complete.  This topic retrieved from [employer] on: Sep 21, 2021.  Topic 49772 Version 17.0  Release: 29.4.2 - C29.263  © 2021 UpToDate, Inc. and/or its affiliates. All rights reserved.  figure 1: Sinuses of the face     This drawing shows the sinuses of the face, from the side and front views.  Graphic 100719 Version 1.0    Consumer Information Use and Disclaimer   This information is not specific medical advice and does not replace information you receive from your health care provider. This is only a brief summary of general information. It does NOT include all information about conditions, illnesses, injuries, tests, procedures, treatments, therapies, discharge instructions or life-style choices that may apply to you. You must talk with your health care provider for complete information about your health and treatment options. This information should not be used to decide whether or not to accept your health care provider's " advice, instructions or recommendations. Only your health care provider has the knowledge and training to provide advice that is right for you. The use of this information is governed by the [employer] End User License Agreement, available at [email or web address]/[email or web address].The use of [employer] content is governed by the [employer] Terms of Use. ©2021 Up[employer]Date, Inc. All rights reserved.  Copyright   © 2021 UpToDate, Inc. and/or its affiliates. All rights reserved.

## 2022-01-26 NOTE — LETTER
January 26, 2022      [facility]  [address] [facility]  [place] [address]  Phone: [phone]  Fax: [phone]       Patient: Cristin Webber   YOB: 2000  Date of Visit: 01/26/2022    To Whom It May Concern:    Nichole Webber  was at [facility] on 01/26/2022. The patient may return to work/school on 01/31/2022 with no restrictions. If you have any questions or concerns, or if I can be of further assistance, please do not hesitate to contact me.    Sincerely,    [place]arkle [place] LPN

## 2022-01-26 NOTE — PROGRESS NOTES
[facility]  Primary Care       PATIENT NAME: Cristin Webber   : 2000    AGE: 21 y.o. DATE: 2022    MRN: [id number]        Reason for Visit / Chief Complaint:  Cough, Nasal Congestion, Sore Throat, Headache, Chills, and Emesis     Subjective:     HPI: Patient states she had a positive home covid test; states she call her OB/GYN and was instructed to come to clinic to be retested. Patient states she has sore throat, headache, body aches, chills, vomiting. Patient states she is 17 weeks pregnant.         Patient states she will get cough med recommend by her OB provider; states she has a list of meds she can take.          Review of Systems: Review of Systems   Constitutional: Positive for chills. Negative for fatigue and fever.   HENT: Positive for sore throat.    Respiratory: Negative for cough, chest tightness and shortness of breath.    Cardiovascular: Negative for chest pain.   Gastrointestinal: Positive for nausea and vomiting. Negative for abdominal pain, constipation and diarrhea.   Genitourinary: Negative for dysuria.   Musculoskeletal: Positive for myalgias. Negative for gait problem.   Skin: Negative for rash.   Neurological: Positive for headaches.          Review of patient's allergies indicates:   Allergen Reactions    Oxycodone         Med List:  Current Outpatient Medications on File Prior to Visit   Medication Sig Dispense Refill    dextromethorphan-guaiFENesin  mg/5 ml (ROBITUSSIN-DM)  mg/5 mL liquid Take 10 mLs by mouth every 6 (six) hours as needed (cough and congestion). 120 mL 1    loratadine (CLARITIN) 10 mg tablet Take 1 tablet (10 mg total) by mouth daily as needed for Allergies. 30 tablet 0     No current facility-administered medications on file prior to visit.       Medical/Social/Family History:  Past Medical History:   Diagnosis Date    Hypertension       Social History     Tobacco Use   Smoking Status Never Smoker   Smokeless Tobacco Never Used  "     Social History     Substance and Sexual Activity   Alcohol Use Not Currently       History reviewed. No pertinent family history.   History reviewed. No pertinent surgical history.     There is no immunization history on file for this patient.       Objective:      Vitals:    01/26/22 0920   BP: 108/76   BP Location: Right arm   Patient Position: Sitting   BP Method: Large (Manual)   Pulse: 104   Resp: 18   Temp: 97.9 °F (36.6 °C)   TempSrc: Oral   SpO2: 98%   Weight: 90.7 kg (200 lb)   Height: 5' 9" (1.753 m)     Body mass index is 29.53 kg/m².     Physical Exam: Physical Exam  Vitals and nursing note reviewed.   Constitutional:       Appearance: Normal appearance.   HENT:      Head: Normocephalic.      Comments: Maxillary and frontal sinus tenderness with palpation     Mouth/Throat:      Mouth: Mucous membranes are moist.   Eyes:      Pupils: Pupils are equal, round, and reactive to light.   Cardiovascular:      Rate and Rhythm: Normal rate and regular rhythm.      Heart sounds: Normal heart sounds.   Pulmonary:      Effort: Pulmonary effort is normal. No respiratory distress.      Breath sounds: Normal breath sounds. No wheezing or rhonchi.   Abdominal:      General: Bowel sounds are normal.      Palpations: Abdomen is soft.   Musculoskeletal:         General: Normal range of motion.      Cervical back: Normal range of motion.   Skin:     General: Skin is warm and dry.   Neurological:      General: No focal deficit present.      Mental Status: She is alert and oriented to person, place, and time.      Gait: Gait normal.   Psychiatric:         Mood and Affect: Mood normal.         Behavior: Behavior normal.          Component      Latest Ref Rng & Units 1/26/2022           9:28 AM   SARS Coronavirus 2 Antigen      Negative Positive (A)    Acceptable       Yes         Assessment:          ICD-10-CM ICD-9-CM   1. COVID-19 virus infection  U07.1 079.89   2. Nonintractable headache, unspecified " chronicity pattern, unspecified headache type  R51.9 784.0   3. Chills  R68.83 780.64   4. Sore throat  J02.9 462   5. Nasal congestion  R09.81 478.19   6. Vomiting, intractability of vomiting not specified, presence of nausea not specified, unspecified vomiting type  R11.10 787.03   7. Cough  R05.9 786.2   8. 17 weeks gestation of pregnancy  Z3A.17 V22.2   9. Sinusitis, unspecified chronicity, unspecified location  J32.9 473.9        Plan:       COVID-19 virus infection  -     zinc 50 mg Tab; Take 1 tablet by mouth once daily. for 14 days  Dispense: 14 each; Refill: 0  -     cholecalciferol, vitamin D3, (VITAMIN D3) 50 mcg (2,000 unit) Tab; Take 1 tablet (2,000 Units total) by mouth once daily. for 14 days  Dispense: 14 tablet; Refill: 0  -     ascorbic Acid (VITAMIN C) 500 mg CpSR; Take 1 capsule (500 mg total) by mouth 3 (three) times daily. for 14 days  Dispense: 42 capsule; Refill: 0  -     famotidine (PEPCID) 20 MG tablet; Take 1 tablet (20 mg total) by mouth 2 (two) times daily. for 14 days  Dispense: 28 tablet; Refill: 0    Nonintractable headache, unspecified chronicity pattern, unspecified headache type  -     SARS Coronavirus 2 Antigen, POCT    Chills  -     SARS Coronavirus 2 Antigen, POCT    Sore throat  -     SARS Coronavirus 2 Antigen, POCT  -     POCT rapid strep A    Nasal congestion  -     SARS Coronavirus 2 Antigen, POCT    Vomiting, intractability of vomiting not specified, presence of nausea not specified, unspecified vomiting type  -     SARS Coronavirus 2 Antigen, POCT  -     ondansetron (ZOFRAN) 4 MG tablet; Take 1 tablet (4 mg total) by mouth every 8 (eight) hours as needed for Nausea.  Dispense: 30 tablet; Refill: 0    Cough  -     SARS Coronavirus 2 Antigen, POCT    17 weeks gestation of pregnancy    Sinusitis, unspecified chronicity, unspecified location  -     amoxicillin (AMOXIL) 500 MG capsule; Take 1 capsule (500 mg total) by mouth every 12 (twelve) hours. for 10 days  Dispense: 20  "capsule; Refill: 0  -     cetirizine (ZYRTEC) 10 MG tablet; Take 1 tablet (10 mg total) by mouth once daily.  Dispense: 30 tablet; Refill: 0          New & refilled meds:  Requested Prescriptions     Signed Prescriptions Disp Refills    amoxicillin (AMOXIL) 500 MG capsule 20 capsule 0     Sig: Take 1 capsule (500 mg total) by mouth every 12 (twelve) hours. for 10 days    zinc 50 mg Tab 14 each 0     Sig: Take 1 tablet by mouth once daily. for 14 days    cholecalciferol, vitamin D3, (VITAMIN D3) 50 mcg (2,000 unit) Tab 14 tablet 0     Sig: Take 1 tablet (2,000 Units total) by mouth once daily. for 14 days    ascorbic Acid (VITAMIN C) 500 mg CpSR 42 capsule 0     Sig: Take 1 capsule (500 mg total) by mouth 3 (three) times daily. for 14 days    famotidine (PEPCID) 20 MG tablet 28 tablet 0     Sig: Take 1 tablet (20 mg total) by mouth 2 (two) times daily. for 14 days    ondansetron (ZOFRAN) 4 MG tablet 30 tablet 0     Sig: Take 1 tablet (4 mg total) by mouth every 8 (eight) hours as needed for Nausea.    cetirizine (ZYRTEC) 10 MG tablet 30 tablet 0     Sig: Take 1 tablet (10 mg total) by mouth once daily.       Follow up if symptoms worsen or fail to improve.     Patient Instructions   Patient Education       COVID-19 Overview   The Basics   Written by the doctors and editors at [facility]   View in ItalianView in [place] PortugueseView in GermanView in JapaneseView in FrenchView in SpanishView video in Bangladeshi   What is COVID-19?   COVID-19 stands for "coronavirus disease 2019." It is caused by a virus called SARS-CoV-2. The virus first appeared in late 2019 and quickly spread around the world.  What are the symptoms of COVID-19?   Symptoms usually start 4 or 5 days after a person is infected with the virus. But in some people, it can take up to 2 weeks for symptoms to appear. Some people never show symptoms at all.  When symptoms do happen, they can include:  · Fever  · Cough  · Trouble breathing  · Feeling " "tired  · Shaking chills  · Muscle aches  · Headache  · Sore throat  · Problems with sense of smell or taste  Some people have digestive problems like nausea or diarrhea. There have also been some reports of rashes or other skin symptoms. For example, some people with COVID-19 get reddish-purple spots on their fingers or toes. But it's not clear why or how often this happens.  For most people, symptoms will get better within a few weeks. But a small number of people get very sick and stop being able to breathe on their own. In severe cases, their organs stop working, which can lead to death.  Some people with COVID-19 continue to have some symptoms for weeks or months. This seems to be more likely in people who are sick enough to need to stay in the hospital. But this can also happen in people who did not get very sick. Doctors are still learning about the long-term effects of COVID-19.  While children can get COVID-19, they are less likely than adults to have severe symptoms. More information about COVID-19 and children is available separately. (See "Patient education: COVID-19 and children (The Basics)".)  Am I at risk for getting seriously ill?   It depends on your age and health. In some people, COVID-19 leads to serious problems like pneumonia, not getting enough oxygen, heart problems, or even death. This risk gets higher as people get older. It is also higher in people who have other health problems like serious heart disease, chronic kidney disease, type 2 diabetes, chronic obstructive pulmonary disease (COPD), sickle cell disease, or obesity. People who have a weak immune system for other reasons (for example, HIV infection or certain medicines), asthma, cystic fibrosis, type 1 diabetes, or high blood pressure might also be at higher risk for serious problems.  How is COVID-19 spread?   The virus that causes COVID-19 mainly spreads from person to person. This usually happens when an infected person coughs, " "sneezes, or talks near other people. The virus is passed through tiny particles from the infected person's lungs and airway. These particles can easily travel through the air to other people who are nearby. In some cases, like in indoor spaces where the same air keeps being blown around, virus in the particles might be able to spread to other people who are farther away.  The virus can be passed easily between people who live together. But it can also spread at gatherings where people are talking close together, shaking hands, hugging, sharing food, or even singing together. Eating at restaurants raises the risk of infection, since people tend to be close to each other and not covering their faces. Doctors also think it is possible to get infected if you touch a surface that has the virus on it and then touch your mouth, nose, or eyes. However, this is probably not very common.  A person can be infected, and spread the virus to others, even without having any symptoms.  Are there different variants of the virus that causes COVID-19?   Yes. Viruses constantly change or "mutate." When this happens, a new strain or "variant" can form. Most of the time, new variants do not change the way a virus works. But when a variant has changes in important parts of the virus, it can act differently.  Experts have discovered several new variants of the virus that causes COVID-19. Certain variants seem to spread more easily than the original virus. They might also make people sicker.  Experts are studying the different variants. This will help them better understand how far they have spread, whether they affect people differently, and how well different vaccines protect against them.  The more people who get vaccinated against COVID-19, the harder it will be for the virus to form new variants.  Is there a test for the virus that causes COVID-19?   Yes. If your doctor or nurse suspects you have COVID-19, they might take a swab from " "inside your nose or mouth for testing. In some cases, they might take a sample of your saliva. These tests can help your doctor figure out if you have COVID-19 or another illness.  There are 2 types of tests used to diagnose COVID-19:  · Molecular tests - These look for the genetic material from the virus. They are also called "nucleic acid tests." You can get a molecular test at a doctor's office, clinic, or pharmacy. There are also places that make these tests available for lots of people, often at drive-through locations. Depending on the lab, it can take up to several days to get test results back.  Molecular tests are the best way to know if a person has COVID-19. That's because they can detect even very low levels of virus in the body.  · Antigen tests - These look for proteins from the virus. They can give results faster than most molecular tests. You can do an antigen test at a doctor's office, clinic, pharmacy, or through some organizations that make testing available in other places. You can also do an antigen test at home.  Antigen tests are not as accurate as molecular tests. They are more likely to give "false negative" results. This is when the test comes back negative even though the person actually is infected. But antigen tests can still be useful in some situations, when results are needed quickly or a molecular test is not available. For example, if a person has early symptoms of COVID-19, an antigen test can be accurate enough to detect virus in their body. If a person gets an antigen test and the result is negative, a molecular test might be needed to confirm they do not have the virus in their body. This might be done if the person has symptoms or knows they were exposed the virus.  There is also a blood test that can show if a person has had COVID-19 in the past. This is called an "antibody" test. Antibody tests are generally not used on their own to diagnose COVID-19 or make decisions about " "care. But experts can use them to learn how many people in a certain area were infected without knowing it.  Can COVID-19 be prevented?   The best way to prevent COVID-19 is to get vaccinated. In the [place], the first vaccines became available in late 2020. People age 12 and older can get a vaccine.  If enough people get the vaccine, the virus will stop spreading so quickly. More information about COVID-19 vaccines, including what you can do after being vaccinated, is available separately. (See "Patient education: COVID-19 vaccines (The Basics)".)  Experts believe that vaccines will be one of the most important ways to control the COVID-19 pandemic. People who are fully vaccinated are at much lower risk of getting the virus.  If you are not yet vaccinated, there are other ways to help protect yourself and others:  · Practice "social distancing." It's most important to avoid contact with people who are sick. But social distancing also means staying at least 6 feet (about 2 meters) from anyone outside your household. That's because the virus can spread easily through close contact, and it's not always possible to know who is infected.  · Wear a face mask when you need to go be in public around other people. This is mostly so that if you are infected, even if you don't have any symptoms, you are less likely to spread the infection to other people. It might also help protect you from others who could be infected. Make sure your mask covers your mouth and nose.  You can buy cloth masks and disposable (non-medical) masks in stores or online. Cloth masks work best if they have several layers of fabric. Your mask should fit snugly over your face with no gaps. You can improve the fit by using a mask with an adjustable nose wire, adjusting or knotting the ear loops to make it tighter, or wearing a cloth mask on top of a disposable mask.  When you take your mask off, make sure you do not touch your eyes, nose, or mouth. " "And wash your hands after you touch the mask. You can wash cloth masks with the rest of your laundry.  When you are outdoors and not around other people, you might not need to wear a mask. But it's important to know what the rules are in your area. The [place] Centers for Disease Control and Prevention (CDC) has more information about how to wear a face mask: www.cdc.gov/coronavirus/2019-ncov/prevent-getting-sick/about-face-coverings.html.  · Wash your hands with soap and water often. This is especially important after being out in public or touching surfaces that many other people also touch, like door handles or railings. The risk of getting infected by touching items like this is probably not very high. Still, it's a good idea to wash your hands often. This also helps protect you from other illnesses, like the flu or the common cold.  Make sure to rub your hands with soap for at least 20 seconds, cleaning your wrists, fingernails, and in between your fingers. Then rinse your hands and dry them with a paper towel you can throw away. If you are not near a sink, you can use a hand sanitizing gel to clean your hands. The gels with at least 60 percent alcohol work the best. But it is better to wash with soap and water if you can.  · Avoid touching your face, especially your mouth, nose, and eyes.  · Avoid or limit traveling if you can. Any form of travel, especially if you spend time in crowded places like airports, increases your risk of getting and spreading infection.  If you do need to travel, be sure to check whether there are any rules about COVID-19 in the area you are visiting. In the [place], some places require people to "self-quarantine" for some length of time if they are visiting (or returning) from another state. This means not going out in public or being around other people. The [place] also requires a negative COVID-19 test for anyone who enters, or returns to, the country. Many " "other countries have testing requirements for visiting, too. All of these rules are meant to help slow the spread of COVID-19.  Once you are fully vaccinated, you are much less likely to get the virus. "Fully vaccinated" means you have had all doses of the vaccine and it has been at least 2 weeks since the last dose. (If you had a single-dose vaccine, you are fully vaccinated 2 weeks after you get the shot.)  What should I do if I have symptoms?   If you have a fever, cough, trouble breathing, or other symptoms of COVID-19, call your doctor or nurse. They will ask about your symptoms. They might also ask about any recent travel and whether you have been around anyone who might have been infected. Then they can tell you if you should come in or go somewhere else to be tested.  If your symptoms are not severe, it is best to call before you go in. The staff can tell you what to do and whether you need to be seen in person. Many people with only mild symptoms should stay home and avoid other people until they get better. If you do need to go to the clinic or hospital, be sure to wear a mask. This helps protect other people. The staff might also have you wait someplace away from other people.  If you are severely ill and need to go to the clinic or hospital right away, you should still call ahead if possible. This way the staff can care for you while taking steps to protect others. If you think you are having a medical emergency, call for an ambulance (in the [place] and [place], dial 9-1-1).  What if I feel fine but think I was exposed?   If you think you were in close contact with someone with COVID-19, what to do next depends on whether you have already had COVID-19 or gotten the vaccine:  · If you have not had COVID-19 or gotten the vaccine - You should get tested after a possible exposure, even if you don't have any symptoms. Call your doctor or nurse if you aren't sure where to get a test. Then self-quarantine at home " "and monitor yourself for symptoms. This means staying home as much as possible, and staying at least 6 feet (2 meters) away from other people in your home.  The safest thing to do after a possible exposure is to self-quarantine for 14 days. This can be challenging with work, school, or other responsibilities. Because of this, some public health departments might allow people to stop quarantining sooner, especially if they get a negative test. If you're not sure how long to quarantine for, contact your local public health office or ask your doctor or nurse.  · If you have had COVID-19 or gotten the vaccine - If you had COVID-19 within the last 3 months, you do not need to self-quarantine. If you had COVID-19 but it was more than 3 months ago, follow the steps above.  If you are fully vaccinated, you do not need to self-quarantine. But you should still get tested 3 to 5 days after you were in contact with the person who had COVID-19. Even though you are much less likely to get the infection after being vaccinated, it is still possible.  If you self-quarantine for less than 14 days, or if you do not need to self-quarantine, you should still monitor yourself for symptoms for the full 14 days. If you start to have any symptoms, call your doctor or nurse right away. You should also be extra careful about wearing a mask and social distancing during this time.  How is COVID-19 treated?   Many people will be able to stay home while they get better. But people with serious symptoms or other health problems might need to go to the hospital.  · Mild illness - Mild illness means you might have symptoms like fever and cough, but you do not have trouble breathing. Most people with COVID-19 have mild illness and can rest at home until they get better. This usually takes about 2 weeks, but it's not the same for everyone.  If you are recovering from COVID-19, it's important to stay home and "self-isolate" until your doctor or nurse " "tells you it's safe to stop. Self-isolation means staying apart from other people, even the people you live with. When you can stop self-isolation will depend on how long it has been since you had symptoms, and in some cases, whether you have had a negative test (showing that the virus is no longer in your body).  · Severe illness - If you have more severe illness with trouble breathing, you might need to stay in the hospital, possibly in the intensive care unit (also called the "ICU"). While you are there, you will most likely be in a special isolation room. Only medical staff will be allowed in the room, and they will have to wear special gowns, gloves, masks, and eye protection.  The doctors and nurses can monitor and support your breathing and other body functions and make you as comfortable as possible. You might need extra oxygen to help you breathe easily. If you are having a very hard time breathing, you might need a breathing tube. The tube goes down your throat and into your lungs. It is connected to a machine to help you breathe, called a "ventilator."  Doctors are studying several possible treatments for COVID-19. In certain cases, they might recommend treatments called "monoclonal antibodies." These treatments seem to help some people who are at risk of getting severely ill.  Doctors also might recommend being part of a clinical trial. A clinical trial is a scientific study that tests new medicines to see how well they work. Do not try any new medicines or treatments without talking to a doctor.  What should I do if someone in my home has COVID-19?   If someone in your home has COVID-19, there are additional things you can do to protect yourself and others:  · Keep the sick person away from others - The sick person should stay in a separate room, and use a different bathroom if possible. They should also eat in their own room.  Experts also recommend that the person stay away from pets in the house " "until they are better.  · Have them wear a mask - The sick person should wear a mask when they are in the same room as other people. If they can't wear a mask, you can help protect yourself by covering your face when you are in the room with them.  · Wash hands - Wash your hands with soap and water often.  · Clean often - Here are some specific things that can help:  ? Wear disposable gloves when you clean. It's also a good idea to wear gloves when you have to touch the sick person's laundry, dishes, utensils, or trash. Wash your hands after removing your gloves.  ? Regularly clean things that are touched a lot. This includes counters, bedside tables, doorknobs, computers, phones, and bathroom surfaces.  ? Clean things in your home with soap and water, but also use disinfectants on appropriate surfaces. Some cleaning products work well to kill bacteria, but not viruses, so it's important to check labels. The [place] Environmental Protection Agency (EPA) has a list of products here: www.epa.gov/pesticide-registration/list-n-disinfectants-use-against-sars-cov-2.  What if I am pregnant?   More information about COVID-19 and pregnancy is available separately. (See "Patient education: COVID-19 and pregnancy (The Basics)".)  If you are pregnant and you have questions about COVID-19, talk to your doctor, nurse, or midwife. They can help.  What can I do to cope with stress and anxiety?   It's normal to feel anxious or worried about COVID-19. It's also normal to feel stressed, lonely, or tired of not being able to do your usual activities. You can take care of yourself by trying to:  · Take breaks from the news  · Get regular exercise and eat healthy foods  · Find activities that you enjoy and can do at home  · Stay in touch with your friends and family members  It might help to remember that by doing things like getting vaccinated and following local guidelines, you are helping to protect other people in your " "community.  Where can I go to learn more?   As we learn more about this virus, expert recommendations will continue to change. Check with your doctor or public health official to get the most updated information about how to protect yourself and others.  For information about COVID-19 in your area, you can call your local public health office. In the [place], this usually means your city or town's Board of Health. Many states also have a "hotline" phone number you can call.  You can find more information about COVID-19 at the following websites:  · [place] Centers for Disease Control and Prevention (CDC): www.cdc.gov/COVID19  · World Health Organization (WHO): www.who.int/emergencies/diseases/novel-coronavirus-2019  All topics are updated as new evidence becomes available and our peer review process is complete.  This topic retrieved from [employer] on: Oct 28, 2021.  Topic 535315 Version 67.0  Release: 29.4.2 - C29.263  © 2021 UpToDate, Inc. and/or its affiliates. All rights reserved.  Consumer Information Use and Disclaimer   This information is not specific medical advice and does not replace information you receive from your health care provider. This is only a brief summary of general information. It does NOT include all information about conditions, illnesses, injuries, tests, procedures, treatments, therapies, discharge instructions or life-style choices that may apply to you. You must talk with your health care provider for complete information about your health and treatment options. This information should not be used to decide whether or not to accept your health care provider's advice, instructions or recommendations. Only your health care provider has the knowledge and training to provide advice that is right for you. The use of this information is governed by the [employer]Cook[employer] End User License Agreement, available at https://www.Be[employer].QUALIA (formerly known as LocalResponse)/en/solutions/FanMiles/about/valerie.The use of " [employer]Works content is governed by the [employer]Works Terms of Use. ©2021 UpToDate, Inc. All rights reserved.  Copyright   © 2021 UpToDate, Inc. and/or its affiliates. All rights reserved.  Patient Education       Sinusitis in Adults   The Basics   Written by the doctors and editors at [facility]   What is sinusitis? -- Sinusitis is a condition that can cause a stuffy nose, pain in the face, and discharge (mucus) from the nose. The sinuses are hollow areas in the bones of the face (figure 1). They have a thin lining that normally makes a small amount of mucus. When this lining gets irritated or infected, it swells and makes extra mucus. This causes symptoms.  Sinusitis can occur when a person gets sick with a cold. The germs causing the cold can also infect the sinuses. Many times, a person feels like their cold is getting better. But then they get sinusitis and begin to feel sick again.  What are the symptoms of sinusitis? -- Common symptoms of sinusitis include:  · Stuffy or blocked nose  · Thick white, yellow, or green discharge from the nose  · Pain in the teeth  · Pain or pressure in the face - This often feels worse when a person bends forward.  People with sinusitis can also have other symptoms that include:  · Fever  · Cough  · Trouble smelling  · Ear pressure or fullness  · Headache  · Bad breath  · Feeling tired  Most of the time, symptoms start to improve in 7 to 10 days.  Should I see a doctor or nurse? -- See your doctor or nurse if your symptoms last more than 10 days, or if your symptoms first get better but then get worse.  Rarely, sinusitis can lead to serious problems. See your doctor or nurse right away (do not wait 10 days) if you have:  · Fever higher than 102°F (38.9°C)  · Sudden and severe pain in the face and head  · Trouble seeing or seeing double  · Trouble thinking clearly  · Swelling or redness around one or both eyes  · A stiff neck  Is there anything I can do on my own to feel better? -- Yes. To  "reduce your symptoms, you can:  · Take an over-the-counter pain reliever to reduce the pain  · Rinse your nose and sinuses with salt water a few times a day - Ask your doctor or nurse about the best way to do this.  Your doctor might also prescribe a steroid nose spray to reduce the swelling in your nose. (These kinds of steroid nose sprays are safe to take, and do not contain the same steroids that some athletes take illegally.)  How is sinusitis treated? -- Most of the time, sinusitis does not need to be treated with antibiotic medicines. This is because most sinusitis is caused by viruses, not bacteria, and antibiotics do not kill viruses. In fact, even sinusitis caused by bacteria will usually get better on its own without antibiotics.   Some people with sinusitis do need treatment with antibiotics. If your symptoms have not improved after 10 days, ask your doctor if you should take antibiotics. Your doctor might recommend that you wait 1 more week to see if your symptoms improve. But if you have symptoms such as a fever or a lot of pain, they might prescribe antibiotics. It is important to follow your doctor's instructions about taking your antibiotics.  What if my symptoms do not get better? -- If your symptoms do not get better, talk with your doctor or nurse. They might order tests to figure out why you still have symptoms. These can include:  · CT scan or other imaging tests - Imaging tests create pictures of the inside of the body.  · A test to look inside the sinuses - For this test, a doctor puts a thin tube with a camera on the end into the nose and up into the sinuses.  Some people get a lot of sinus infections or have symptoms that last at least 3 months. These people can have a different type of sinusitis called "chronic sinusitis." Chronic sinusitis can be caused by different things. For example, some people have growths inside their nose or sinuses that are called "polyps." Other people have " allergies that cause their symptoms.  Chronic sinusitis can be treated in different ways. If you have chronic sinusitis, talk with your doctor about which treatments are right for you.  All topics are updated as new evidence becomes available and our peer review process is complete.  This topic retrieved from [employer] on: Sep 21, 2021.  Topic 03765 Version 17.0  Release: 29.4.2 - C29.263  © 2021 UpToDate, Inc. and/or its affiliates. All rights reserved.  figure 1: Sinuses of the face     This drawing shows the sinuses of the face, from the side and front views.  Graphic 732417 Version 1.0    Consumer Information Use and Disclaimer   This information is not specific medical advice and does not replace information you receive from your health care provider. This is only a brief summary of general information. It does NOT include all information about conditions, illnesses, injuries, tests, procedures, treatments, therapies, discharge instructions or life-style choices that may apply to you. You must talk with your health care provider for complete information about your health and treatment options. This information should not be used to decide whether or not to accept your health care provider's advice, instructions or recommendations. Only your health care provider has the knowledge and training to provide advice that is right for you. The use of this information is governed by the [employer] End User License Agreement, available at https://www.Hojo.pl.hi5/en/solutions/EdÃºkame/about/valerie.The use of [employer] content is governed by the [employer] Terms of Use. ©2021 UpToDate, Inc. All rights reserved.  Copyright   © 2021 UpToDate, Inc. and/or its affiliates. All rights reserved.           Signature: Calos Devries DNP, FNP-C

## 2022-02-09 ENCOUNTER — HOSPITAL ENCOUNTER (OUTPATIENT)
Dept: RADIOLOGY | Facility: HOSPITAL | Age: 22
Discharge: HOME | End: 2022-02-09
Attending: ADVANCED PRACTICE MIDWIFE
Payer: COMMERCIAL

## 2022-02-09 PROCEDURE — 76805 US OB 14+ WEEKS, TRANSABDOM, SINGLE GESTATION: ICD-10-PCS | Mod: 26,,, | Performed by: RADIOLOGY

## 2022-02-09 PROCEDURE — 76805 OB US >/= 14 WKS SNGL FETUS: CPT | Mod: TC

## 2022-02-09 PROCEDURE — 76805 OB US >/= 14 WKS SNGL FETUS: CPT | Mod: 26,,, | Performed by: RADIOLOGY

## 2022-03-08 ENCOUNTER — HOSPITAL ENCOUNTER (OUTPATIENT)
Dept: RADIOLOGY | Facility: HOSPITAL | Age: 22
Discharge: HOME | End: 2022-03-08
Attending: ADVANCED PRACTICE MIDWIFE
Payer: COMMERCIAL

## 2022-03-08 DIAGNOSIS — R07.9 CHEST PAIN: ICD-10-CM

## 2022-03-08 PROCEDURE — 93010 EKG 12-LEAD: ICD-10-PCS | Mod: ,,, | Performed by: INTERNAL MEDICINE

## 2022-03-08 PROCEDURE — 71046 X-RAY EXAM CHEST 2 VIEWS: CPT | Mod: TC

## 2022-03-08 PROCEDURE — 93010 ELECTROCARDIOGRAM REPORT: CPT | Mod: ,,, | Performed by: INTERNAL MEDICINE

## 2022-03-08 PROCEDURE — 93005 ELECTROCARDIOGRAM TRACING: CPT

## 2022-08-24 ENCOUNTER — OFFICE VISIT (OUTPATIENT)
Dept: PRIMARY CARE CLINIC | Facility: CLINIC | Age: 22
End: 2022-08-24
Payer: COMMERCIAL

## 2022-08-24 VITALS
BODY MASS INDEX: 31.84 KG/M2 | OXYGEN SATURATION: 97 % | WEIGHT: 215 LBS | RESPIRATION RATE: 18 BRPM | TEMPERATURE: 98 F | SYSTOLIC BLOOD PRESSURE: 110 MMHG | HEIGHT: 69 IN | DIASTOLIC BLOOD PRESSURE: 70 MMHG | HEART RATE: 118 BPM

## 2022-08-24 DIAGNOSIS — R09.81 NASAL CONGESTION: ICD-10-CM

## 2022-08-24 DIAGNOSIS — R51.9 NONINTRACTABLE HEADACHE, UNSPECIFIED CHRONICITY PATTERN, UNSPECIFIED HEADACHE TYPE: ICD-10-CM

## 2022-08-24 DIAGNOSIS — R50.9 FEVER, UNSPECIFIED FEVER CAUSE: ICD-10-CM

## 2022-08-24 DIAGNOSIS — R05.9 COUGH: ICD-10-CM

## 2022-08-24 DIAGNOSIS — R09.89 RUNNY NOSE: ICD-10-CM

## 2022-08-24 DIAGNOSIS — J02.9 SORE THROAT: ICD-10-CM

## 2022-08-24 DIAGNOSIS — J32.9 SINUSITIS, UNSPECIFIED CHRONICITY, UNSPECIFIED LOCATION: Primary | ICD-10-CM

## 2022-08-24 LAB
CTP QC/QA: YES
CTP QC/QA: YES
FLUAV AG NPH QL: NEGATIVE
FLUBV AG NPH QL: NEGATIVE
S PYO RRNA THROAT QL PROBE: NEGATIVE
SARS-COV-2 AG RESP QL IA.RAPID: NEGATIVE

## 2022-08-24 PROCEDURE — 99213 PR OFFICE/OUTPT VISIT, EST, LEVL III, 20-29 MIN: ICD-10-PCS | Mod: 25,,, | Performed by: NURSE PRACTITIONER

## 2022-08-24 PROCEDURE — 87880 STREP A ASSAY W/OPTIC: CPT | Mod: QW,,, | Performed by: NURSE PRACTITIONER

## 2022-08-24 PROCEDURE — 3074F SYST BP LT 130 MM HG: CPT | Mod: CPTII,,, | Performed by: NURSE PRACTITIONER

## 2022-08-24 PROCEDURE — 87428 SARSCOV & INF VIR A&B AG IA: CPT | Mod: QW,,, | Performed by: NURSE PRACTITIONER

## 2022-08-24 PROCEDURE — 1159F PR MEDICATION LIST DOCUMENTED IN MEDICAL RECORD: ICD-10-PCS | Mod: CPTII,,, | Performed by: NURSE PRACTITIONER

## 2022-08-24 PROCEDURE — 3008F BODY MASS INDEX DOCD: CPT | Mod: CPTII,,, | Performed by: NURSE PRACTITIONER

## 2022-08-24 PROCEDURE — 3078F DIAST BP <80 MM HG: CPT | Mod: CPTII,,, | Performed by: NURSE PRACTITIONER

## 2022-08-24 PROCEDURE — 3008F PR BODY MASS INDEX (BMI) DOCUMENTED: ICD-10-PCS | Mod: CPTII,,, | Performed by: NURSE PRACTITIONER

## 2022-08-24 PROCEDURE — 1159F MED LIST DOCD IN RCRD: CPT | Mod: CPTII,,, | Performed by: NURSE PRACTITIONER

## 2022-08-24 PROCEDURE — 3074F PR MOST RECENT SYSTOLIC BLOOD PRESSURE < 130 MM HG: ICD-10-PCS | Mod: CPTII,,, | Performed by: NURSE PRACTITIONER

## 2022-08-24 PROCEDURE — 96372 PR INJECTION,THERAP/PROPH/DIAG2ST, IM OR SUBCUT: ICD-10-PCS | Mod: ,,, | Performed by: NURSE PRACTITIONER

## 2022-08-24 PROCEDURE — 87428 POCT SARS-COV2 (COVID) WITH FLU ANTIGEN: ICD-10-PCS | Mod: QW,,, | Performed by: NURSE PRACTITIONER

## 2022-08-24 PROCEDURE — 96372 THER/PROPH/DIAG INJ SC/IM: CPT | Mod: ,,, | Performed by: NURSE PRACTITIONER

## 2022-08-24 PROCEDURE — 99213 OFFICE O/P EST LOW 20 MIN: CPT | Mod: 25,,, | Performed by: NURSE PRACTITIONER

## 2022-08-24 PROCEDURE — 3078F PR MOST RECENT DIASTOLIC BLOOD PRESSURE < 80 MM HG: ICD-10-PCS | Mod: CPTII,,, | Performed by: NURSE PRACTITIONER

## 2022-08-24 PROCEDURE — 87880 POCT RAPID STREP A: ICD-10-PCS | Mod: QW,,, | Performed by: NURSE PRACTITIONER

## 2022-08-24 RX ORDER — CEFTRIAXONE 1 G/1
1 INJECTION, POWDER, FOR SOLUTION INTRAMUSCULAR; INTRAVENOUS
Status: COMPLETED | OUTPATIENT
Start: 2022-08-24 | End: 2022-08-24

## 2022-08-24 RX ORDER — FLUTICASONE PROPIONATE 50 MCG
1 SPRAY, SUSPENSION (ML) NASAL DAILY
Qty: 16 G | Refills: 0 | Status: SHIPPED | OUTPATIENT
Start: 2022-08-24 | End: 2023-10-11 | Stop reason: ALTCHOICE

## 2022-08-24 RX ORDER — AMOXICILLIN AND CLAVULANATE POTASSIUM 500; 125 MG/1; MG/1
1 TABLET, FILM COATED ORAL EVERY 12 HOURS
Qty: 20 TABLET | Refills: 0 | Status: SHIPPED | OUTPATIENT
Start: 2022-08-24 | End: 2022-09-03

## 2022-08-24 RX ORDER — BETAMETHASONE SODIUM PHOSPHATE AND BETAMETHASONE ACETATE 3; 3 MG/ML; MG/ML
6 INJECTION, SUSPENSION INTRA-ARTICULAR; INTRALESIONAL; INTRAMUSCULAR; SOFT TISSUE ONCE
Status: COMPLETED | OUTPATIENT
Start: 2022-08-24 | End: 2022-08-24

## 2022-08-24 RX ORDER — CETIRIZINE HYDROCHLORIDE 10 MG/1
10 TABLET ORAL DAILY
Qty: 30 TABLET | Refills: 0 | Status: SHIPPED | OUTPATIENT
Start: 2022-08-24 | End: 2023-10-11 | Stop reason: ALTCHOICE

## 2022-08-24 RX ADMIN — BETAMETHASONE SODIUM PHOSPHATE AND BETAMETHASONE ACETATE 6 MG: 3; 3 INJECTION, SUSPENSION INTRA-ARTICULAR; INTRALESIONAL; INTRAMUSCULAR; SOFT TISSUE at 09:08

## 2022-08-24 RX ADMIN — CEFTRIAXONE 1 G: 1 INJECTION, POWDER, FOR SOLUTION INTRAMUSCULAR; INTRAVENOUS at 09:08

## 2022-08-24 NOTE — LETTER
August 24, 2022      [facility]  [address]   [place] [address]  Phone: [phone]  Fax: [phone]       Patient: Cristin Webber   YOB: 2000  Date of Visit: 08/24/2022    To Whom It May Concern:    Nichole Webber  was at [facility] on 08/24/2022. The patient may return to work/school on 08/30/2022 with no restrictions. If you have any questions or concerns, or if I can be of further assistance, please do not hesitate to contact me.    Sincerely,    Calos Devries DNP,FNP-C

## 2022-08-24 NOTE — PROGRESS NOTES
[facility]  Primary Care       PATIENT NAME: Cristin Webber   : 2000    AGE: 22 y.o. DATE: 2022    MRN: [id number]        Reason for Visit / Chief Complaint:  Sore Throat, Cough, Nasal Congestion, Headache, and Fever     Subjective:     HPI: Patient complains of sore throat, sinus pressure, cough, sneezing, runny nose, nasal congestion; states she had fever last night and took Tylenol.          Review of Systems: Review of Systems   Constitutional: Positive for fever. Negative for chills and fatigue.   HENT: Positive for congestion, rhinorrhea, sinus pressure and sore throat. Negative for ear pain.    Respiratory: Positive for cough. Negative for chest tightness and shortness of breath.    Cardiovascular: Negative for chest pain.   Gastrointestinal: Negative for abdominal pain, constipation, diarrhea, nausea and vomiting.   Genitourinary: Negative for dysuria.   Musculoskeletal: Negative for gait problem.   Skin: Negative for rash.   Neurological: Positive for headaches.          Review of patient's allergies indicates:   Allergen Reactions    Oxycodone         Med List:  Current Outpatient Medications on File Prior to Visit   Medication Sig Dispense Refill    dextromethorphan-guaiFENesin  mg/5 ml (ROBITUSSIN-DM)  mg/5 mL liquid Take 10 mLs by mouth every 6 (six) hours as needed (cough and congestion). (Patient not taking: Reported on 2022) 120 mL 1    famotidine (PEPCID) 20 MG tablet Take 1 tablet (20 mg total) by mouth 2 (two) times daily. for 14 days 28 tablet 0    loratadine (CLARITIN) 10 mg tablet Take 1 tablet (10 mg total) by mouth daily as needed for Allergies. 30 tablet 0    [DISCONTINUED] cetirizine (ZYRTEC) 10 MG tablet Take 1 tablet (10 mg total) by mouth once daily. 30 tablet 0    [DISCONTINUED] ondansetron (ZOFRAN) 4 MG tablet Take 1 tablet (4 mg total) by mouth every 8 (eight) hours as needed for Nausea. (Patient not taking: Reported on 2022) 30  "tablet 0     No current facility-administered medications on file prior to visit.       Medical/Social/Family History:  Past Medical History:   Diagnosis Date    Hypertension       Social History     Tobacco Use   Smoking Status Never Smoker   Smokeless Tobacco Never Used      Social History     Substance and Sexual Activity   Alcohol Use Not Currently       History reviewed. No pertinent family history.   History reviewed. No pertinent surgical history.     There is no immunization history on file for this patient.       Objective:      Vitals:    08/24/22 0841   BP: 110/70   BP Location: Right arm   Patient Position: Sitting   BP Method: Large (Manual)   Pulse: (!) 118   Resp: 18   Temp: 98.3 °F (36.8 °C)   TempSrc: Oral   SpO2: 97%   Weight: 97.5 kg (215 lb)   Height: 5' 9" (1.753 m)     Body mass index is 31.75 kg/m².     Physical Exam: Physical Exam  Vitals and nursing note reviewed.   Constitutional:       Appearance: Normal appearance.   HENT:      Head: Normocephalic.      Right Ear: Tympanic membrane, ear canal and external ear normal.      Left Ear: There is impacted cerumen.      Nose: Congestion and rhinorrhea present.      Mouth/Throat:      Mouth: Mucous membranes are moist.      Pharynx: Posterior oropharyngeal erythema present.   Eyes:      Extraocular Movements: Extraocular movements intact.      Conjunctiva/sclera: Conjunctivae normal.      Pupils: Pupils are equal, round, and reactive to light.   Cardiovascular:      Rate and Rhythm: Regular rhythm. Tachycardia present.      Heart sounds: Normal heart sounds.   Pulmonary:      Effort: Pulmonary effort is normal. No respiratory distress.      Breath sounds: Normal breath sounds. No wheezing or rhonchi.   Musculoskeletal:         General: Normal range of motion.      Cervical back: Normal range of motion and neck supple.   Skin:     General: Skin is warm and dry.   Neurological:      General: No focal deficit present.      Mental Status: She is " alert and oriented to person, place, and time.      Gait: Gait normal.   Psychiatric:         Mood and Affect: Mood normal.         Behavior: Behavior normal.          Component      Latest Ref Rng & Units 8/24/2022           8:59 AM   SARS Coronavirus 2 Antigen      Negative Negative   Rapid Influenza A Ag      Negative Negative   Rapid Influenza B Ag      Negative Negative    Acceptable       Yes     Component      Latest Ref Rng & Units 8/24/2022             RAPID STREP A SCREEN      Negative Negative    Acceptable       Yes       Assessment:          ICD-10-CM ICD-9-CM   1. Sinusitis, unspecified chronicity, unspecified location  J32.9 473.9   2. Nasal congestion  R09.81 478.19   3. Sore throat  J02.9 462   4. Cough  R05.9 786.2   5. Fever, unspecified fever cause  R50.9 780.60   6. Runny nose  R09.89 784.99   7. Nonintractable headache, unspecified chronicity pattern, unspecified headache type  R51.9 784.0        Plan:       Sinusitis, unspecified chronicity, unspecified location  -     amoxicillin-clavulanate 500-125mg (AUGMENTIN) 500-125 mg Tab; Take 1 tablet (500 mg total) by mouth every 12 (twelve) hours. for 10 days  Dispense: 20 tablet; Refill: 0  -     fluticasone propionate (FLONASE) 50 mcg/actuation nasal spray; 1 spray (50 mcg total) by Each Nostril route once daily.  Dispense: 16 g; Refill: 0  -     cetirizine (ZYRTEC) 10 MG tablet; Take 1 tablet (10 mg total) by mouth once daily.  Dispense: 30 tablet; Refill: 0  -     cefTRIAXone injection 1 g  -     betamethasone acetate-betamethasone sodium phosphate injection 6 mg    Nasal congestion  -     POCT SARS-COV2 (COVID) with Flu Antigen    Sore throat  -     POCT rapid strep A  -     POCT SARS-COV2 (COVID) with Flu Antigen    Cough  -     POCT SARS-COV2 (COVID) with Flu Antigen    Fever, unspecified fever cause  -     POCT SARS-COV2 (COVID) with Flu Antigen    Runny nose  -     POCT SARS-COV2 (COVID) with Flu  Antigen    Nonintractable headache, unspecified chronicity pattern, unspecified headache type  -     POCT SARS-COV2 (COVID) with Flu Antigen          New & refilled meds:  Requested Prescriptions     Signed Prescriptions Disp Refills    amoxicillin-clavulanate 500-125mg (AUGMENTIN) 500-125 mg Tab 20 tablet 0     Sig: Take 1 tablet (500 mg total) by mouth every 12 (twelve) hours. for 10 days    fluticasone propionate (FLONASE) 50 mcg/actuation nasal spray 16 g 0     Si spray (50 mcg total) by Each Nostril route once daily.    cetirizine (ZYRTEC) 10 MG tablet 30 tablet 0     Sig: Take 1 tablet (10 mg total) by mouth once daily.       Follow up if symptoms worsen or fail to improve.     Patient Instructions   Patient Education       Sinusitis in Adults   The Basics   Written by the doctors and editors at [facility]   What is sinusitis? -- Sinusitis is a condition that can cause a stuffy nose, pain in the face, and discharge (mucus) from the nose. The sinuses are hollow areas in the bones of the face (figure 1). They have a thin lining that normally makes a small amount of mucus. When this lining gets irritated or infected, it swells and makes extra mucus. This causes symptoms.  Sinusitis can occur when a person gets sick with a cold. The germs causing the cold can also infect the sinuses. Many times, a person feels like their cold is getting better. But then they get sinusitis and begin to feel sick again.  What are the symptoms of sinusitis? -- Common symptoms of sinusitis include:  · Stuffy or blocked nose  · Thick white, yellow, or green discharge from the nose  · Pain in the teeth  · Pain or pressure in the face - This often feels worse when a person bends forward.  People with sinusitis can also have other symptoms that include:  · Fever  · Cough  · Trouble smelling  · Ear pressure or fullness  · Headache  · Bad breath  · Feeling tired  Most of the time, symptoms start to improve in 7 to 10 days.  Should I  see a doctor or nurse? -- See your doctor or nurse if your symptoms last more than 10 days, or if your symptoms first get better but then get worse.  Rarely, sinusitis can lead to serious problems. See your doctor or nurse right away (do not wait 10 days) if you have:  · Fever higher than 102°F (38.9°C)  · Sudden and severe pain in the face and head  · Trouble seeing or seeing double  · Trouble thinking clearly  · Swelling or redness around one or both eyes  · A stiff neck  Is there anything I can do on my own to feel better? -- Yes. To reduce your symptoms, you can:  · Take an over-the-counter pain reliever to reduce the pain  · Rinse your nose and sinuses with salt water a few times a day - Ask your doctor or nurse about the best way to do this.  Your doctor might also prescribe a steroid nose spray to reduce the swelling in your nose. (These kinds of steroid nose sprays are safe to take, and do not contain the same steroids that some athletes take illegally.)  How is sinusitis treated? -- Most of the time, sinusitis does not need to be treated with antibiotic medicines. This is because most sinusitis is caused by viruses, not bacteria, and antibiotics do not kill viruses. In fact, even sinusitis caused by bacteria will usually get better on its own without antibiotics.   Some people with sinusitis do need treatment with antibiotics. If your symptoms have not improved after 10 days, ask your doctor if you should take antibiotics. Your doctor might recommend that you wait 1 more week to see if your symptoms improve. But if you have symptoms such as a fever or a lot of pain, they might prescribe antibiotics. It is important to follow your doctor's instructions about taking your antibiotics.  What if my symptoms do not get better? -- If your symptoms do not get better, talk with your doctor or nurse. They might order tests to figure out why you still have symptoms. These can include:  · CT scan or other imaging tests  "- Imaging tests create pictures of the inside of the body.  · A test to look inside the sinuses - For this test, a doctor puts a thin tube with a camera on the end into the nose and up into the sinuses.  Some people get a lot of sinus infections or have symptoms that last at least 3 months. These people can have a different type of sinusitis called "chronic sinusitis." Chronic sinusitis can be caused by different things. For example, some people have growths inside their nose or sinuses that are called "polyps." Other people have allergies that cause their symptoms.  Chronic sinusitis can be treated in different ways. If you have chronic sinusitis, talk with your doctor about which treatments are right for you.  All topics are updated as new evidence becomes available and our peer review process is complete.  This topic retrieved from [employer] on: Sep 21, 2021.  Topic 08508 Version 17.0  Release: 29.4.2 - C29.263  © 2021 UpToDate, Inc. and/or its affiliates. All rights reserved.  figure 1: Sinuses of the face     This drawing shows the sinuses of the face, from the side and front views.  Graphic 210443 Version 1.0    Consumer Information Use and Disclaimer   This information is not specific medical advice and does not replace information you receive from your health care provider. This is only a brief summary of general information. It does NOT include all information about conditions, illnesses, injuries, tests, procedures, treatments, therapies, discharge instructions or life-style choices that may apply to you. You must talk with your health care provider for complete information about your health and treatment options. This information should not be used to decide whether or not to accept your health care provider's advice, instructions or recommendations. Only your health care provider has the knowledge and training to provide advice that is right for you. The use of this information is governed by the [employer] " End User License Agreement, available at https://www.CLIPPATE.Eyeonplay/en/solutions/lexicomp/about/valerie.The use of [employer] content is governed by the [employer] Terms of Use. ©2021 UpToDate, Inc. All rights reserved.  Copyright   © 2021 UpToDate, Inc. and/or its affiliates. All rights reserved.           Signature: Calos Devries DNP, VINAY-C

## 2022-08-24 NOTE — LETTER
August 24, 2022      [facility]  [address]   [place] [address]  Phone: [phone]  Fax: [phone]       Patient: Cristin Webber   YOB: 2000  Date of Visit: 08/24/2022    To Whom It May Concern:    Nichole Webber  was at [facility] on 08/24/2022. The patient may return to work/school on 08/26/2022 with no restrictions. If you have any questions or concerns, or if I can be of further assistance, please do not hesitate to contact me.    Sincerely,    Calos Devries DNP,FNP-C

## 2023-07-25 DIAGNOSIS — Z30.41 ENCOUNTER FOR SURVEILLANCE OF CONTRACEPTIVE PILLS: Primary | ICD-10-CM

## 2023-07-25 RX ORDER — DROSPIRENONE AND ETHINYL ESTRADIOL 0.02-3(28)
1 KIT ORAL DAILY
Qty: 30 TABLET | Refills: 11 | Status: SHIPPED | OUTPATIENT
Start: 2023-07-25 | End: 2024-01-09

## 2023-10-11 ENCOUNTER — HOSPITAL ENCOUNTER (EMERGENCY)
Facility: HOSPITAL | Age: 23
Discharge: HOME | End: 2023-10-12
Attending: EMERGENCY MEDICINE
Payer: COMMERCIAL

## 2023-10-11 DIAGNOSIS — N10 ACUTE PYELONEPHRITIS: Primary | ICD-10-CM

## 2023-10-11 LAB
ALBUMIN SERPL BCP-MCNC: 3.9 G/DL (ref 3.5–5)
ALBUMIN/GLOB SERPL: 1.1 {RATIO}
ALP SERPL-CCNC: 53 U/L (ref 52–144)
ALT SERPL W P-5'-P-CCNC: 17 U/L (ref 13–56)
AMYLASE SERPL-CCNC: 31 U/L (ref 25–115)
ANION GAP SERPL CALCULATED.3IONS-SCNC: 11 MMOL/L (ref 7–16)
AST SERPL W P-5'-P-CCNC: 12 U/L (ref 15–37)
BACTERIA #/AREA URNS HPF: ABNORMAL /HPF
BASOPHILS # BLD AUTO: 0.05 K/UL (ref 0–0.2)
BASOPHILS NFR BLD AUTO: 0.6 % (ref 0–1)
BILIRUB SERPL-MCNC: 0.2 MG/DL (ref ?–1.2)
BILIRUB UR QL STRIP: NEGATIVE
BUN SERPL-MCNC: 11 MG/DL (ref 7–18)
BUN/CREAT SERPL: 11 (ref 6–20)
CALCIUM SERPL-MCNC: 9.3 MG/DL (ref 8.5–10.1)
CHLORIDE SERPL-SCNC: 105 MMOL/L (ref 98–107)
CLARITY UR: ABNORMAL
CO2 SERPL-SCNC: 28 MMOL/L (ref 21–32)
COLOR UR: YELLOW
CREAT SERPL-MCNC: 1 MG/DL (ref 0.55–1.02)
DIFFERENTIAL METHOD BLD: ABNORMAL
EGFR (NO RACE VARIABLE) (RUSH/TITUS): 82 ML/MIN/1.73M2
EOSINOPHIL # BLD AUTO: 0.16 K/UL (ref 0–0.5)
EOSINOPHIL NFR BLD AUTO: 1.9 % (ref 1–4)
ERYTHROCYTE [DISTWIDTH] IN BLOOD BY AUTOMATED COUNT: 13 % (ref 11.5–14.5)
GLOBULIN SER-MCNC: 3.6 G/DL (ref 2–4)
GLUCOSE SERPL-MCNC: 109 MG/DL (ref 74–106)
GLUCOSE UR STRIP-MCNC: NEGATIVE MG/DL
HCG UR QL IA.RAPID: NEGATIVE
HCT VFR BLD AUTO: 40.9 % (ref 38–47)
HGB BLD-MCNC: 14 G/DL (ref 12–16)
IMM GRANULOCYTES # BLD AUTO: 0.02 K/UL (ref 0–0.04)
IMM GRANULOCYTES NFR BLD: 0.2 % (ref 0–0.4)
KETONES UR STRIP-SCNC: ABNORMAL MG/DL
LEUKOCYTE ESTERASE UR QL STRIP: NEGATIVE
LIPASE SERPL-CCNC: 67 U/L (ref 73–393)
LYMPHOCYTES # BLD AUTO: 3.57 K/UL (ref 1–4.8)
LYMPHOCYTES NFR BLD AUTO: 42.3 % (ref 27–41)
MCH RBC QN AUTO: 29.2 PG (ref 27–31)
MCHC RBC AUTO-ENTMCNC: 34.2 G/DL (ref 32–36)
MCV RBC AUTO: 85.2 FL (ref 80–96)
MONOCYTES # BLD AUTO: 0.57 K/UL (ref 0–0.8)
MONOCYTES NFR BLD AUTO: 6.8 % (ref 2–6)
MPC BLD CALC-MCNC: 10 FL (ref 9.4–12.4)
MUCOUS THREADS #/AREA URNS HPF: ABNORMAL /HPF
NEUTROPHILS # BLD AUTO: 4.07 K/UL (ref 1.8–7.7)
NEUTROPHILS NFR BLD AUTO: 48.2 % (ref 53–65)
NITRITE UR QL STRIP: POSITIVE
NRBC # BLD AUTO: 0 X10E3/UL
NRBC, AUTO (.00): 0 %
PH UR STRIP: 6 PH UNITS
PLATELET # BLD AUTO: 384 K/UL (ref 150–400)
POTASSIUM SERPL-SCNC: 3.6 MMOL/L (ref 3.5–5.1)
PROT SERPL-MCNC: 7.5 G/DL (ref 6.4–8.2)
PROT UR QL STRIP: ABNORMAL
RBC # BLD AUTO: 4.8 M/UL (ref 4.2–5.4)
RBC # UR STRIP: ABNORMAL /UL
RBC #/AREA URNS HPF: ABNORMAL /HPF
SODIUM SERPL-SCNC: 140 MMOL/L (ref 136–145)
SP GR UR STRIP: >=1.03
SQUAMOUS #/AREA URNS LPF: ABNORMAL /LPF
UROBILINOGEN UR STRIP-ACNC: 0.2 MG/DL
WBC # BLD AUTO: 8.44 K/UL (ref 4.5–11)
WBC #/AREA URNS HPF: ABNORMAL /HPF

## 2023-10-11 PROCEDURE — 99284 PR EMERGENCY DEPT VISIT,LEVEL IV: ICD-10-PCS | Mod: ,,, | Performed by: EMERGENCY MEDICINE

## 2023-10-11 PROCEDURE — 80053 COMPREHEN METABOLIC PANEL: CPT | Performed by: EMERGENCY MEDICINE

## 2023-10-11 PROCEDURE — 87086 URINE CULTURE/COLONY COUNT: CPT | Performed by: EMERGENCY MEDICINE

## 2023-10-11 PROCEDURE — 81025 URINE PREGNANCY TEST: CPT | Performed by: EMERGENCY MEDICINE

## 2023-10-11 PROCEDURE — 96365 THER/PROPH/DIAG IV INF INIT: CPT

## 2023-10-11 PROCEDURE — 96375 TX/PRO/DX INJ NEW DRUG ADDON: CPT

## 2023-10-11 PROCEDURE — 63600175 PHARM REV CODE 636 W HCPCS: Performed by: EMERGENCY MEDICINE

## 2023-10-11 PROCEDURE — 87077 CULTURE AEROBIC IDENTIFY: CPT | Performed by: EMERGENCY MEDICINE

## 2023-10-11 PROCEDURE — 83690 ASSAY OF LIPASE: CPT | Performed by: EMERGENCY MEDICINE

## 2023-10-11 PROCEDURE — 99284 EMERGENCY DEPT VISIT MOD MDM: CPT | Mod: 25

## 2023-10-11 PROCEDURE — 81001 URINALYSIS AUTO W/SCOPE: CPT | Performed by: EMERGENCY MEDICINE

## 2023-10-11 PROCEDURE — 82150 ASSAY OF AMYLASE: CPT | Performed by: EMERGENCY MEDICINE

## 2023-10-11 PROCEDURE — 25000003 PHARM REV CODE 250: Performed by: EMERGENCY MEDICINE

## 2023-10-11 PROCEDURE — 85025 COMPLETE CBC W/AUTO DIFF WBC: CPT | Performed by: EMERGENCY MEDICINE

## 2023-10-11 PROCEDURE — 99284 EMERGENCY DEPT VISIT MOD MDM: CPT | Mod: ,,, | Performed by: EMERGENCY MEDICINE

## 2023-10-11 RX ORDER — ONDANSETRON 2 MG/ML
4 INJECTION INTRAMUSCULAR; INTRAVENOUS
Status: COMPLETED | OUTPATIENT
Start: 2023-10-11 | End: 2023-10-11

## 2023-10-11 RX ORDER — CEFDINIR 300 MG/1
300 CAPSULE ORAL 2 TIMES DAILY
Qty: 20 CAPSULE | Refills: 0 | Status: SHIPPED | OUTPATIENT
Start: 2023-10-11 | End: 2023-10-21

## 2023-10-11 RX ORDER — PROCHLORPERAZINE EDISYLATE 5 MG/ML
10 INJECTION INTRAMUSCULAR; INTRAVENOUS
Status: COMPLETED | OUTPATIENT
Start: 2023-10-12 | End: 2023-10-11

## 2023-10-11 RX ORDER — MORPHINE SULFATE 4 MG/ML
4 INJECTION, SOLUTION INTRAMUSCULAR; INTRAVENOUS
Status: COMPLETED | OUTPATIENT
Start: 2023-10-11 | End: 2023-10-11

## 2023-10-11 RX ORDER — DEXTROAMPHETAMINE SACCHARATE, AMPHETAMINE ASPARTATE, DEXTROAMPHETAMINE SULFATE AND AMPHETAMINE SULFATE 2.5; 2.5; 2.5; 2.5 MG/1; MG/1; MG/1; MG/1
1 TABLET ORAL 2 TIMES DAILY
COMMUNITY
Start: 2023-09-05

## 2023-10-11 RX ORDER — BUSPIRONE HYDROCHLORIDE 5 MG/1
5 TABLET ORAL 2 TIMES DAILY PRN
COMMUNITY
Start: 2023-09-05

## 2023-10-11 RX ORDER — ONDANSETRON 4 MG/1
4 TABLET, ORALLY DISINTEGRATING ORAL EVERY 6 HOURS PRN
Qty: 12 TABLET | Refills: 0 | Status: SHIPPED | OUTPATIENT
Start: 2023-10-11 | End: 2023-10-14

## 2023-10-11 RX ADMIN — PROCHLORPERAZINE EDISYLATE 10 MG: 5 INJECTION INTRAMUSCULAR; INTRAVENOUS at 11:10

## 2023-10-11 RX ADMIN — DEXTROSE MONOHYDRATE 1 G: 5 INJECTION INTRAVENOUS at 11:10

## 2023-10-11 RX ADMIN — SODIUM CHLORIDE 1000 ML: 9 INJECTION, SOLUTION INTRAVENOUS at 11:10

## 2023-10-11 RX ADMIN — MORPHINE SULFATE 4 MG: 4 INJECTION, SOLUTION INTRAMUSCULAR; INTRAVENOUS at 11:10

## 2023-10-11 RX ADMIN — ONDANSETRON 4 MG: 2 INJECTION INTRAMUSCULAR; INTRAVENOUS at 11:10

## 2023-10-12 ENCOUNTER — HOSPITAL ENCOUNTER (OUTPATIENT)
Dept: RADIOLOGY | Facility: HOSPITAL | Age: 23
Discharge: HOME | End: 2023-10-12
Attending: EMERGENCY MEDICINE
Payer: COMMERCIAL

## 2023-10-12 ENCOUNTER — OFFICE VISIT (OUTPATIENT)
Dept: PRIMARY CARE CLINIC | Facility: CLINIC | Age: 23
End: 2023-10-12
Payer: COMMERCIAL

## 2023-10-12 VITALS
TEMPERATURE: 98 F | RESPIRATION RATE: 20 BRPM | DIASTOLIC BLOOD PRESSURE: 60 MMHG | BODY MASS INDEX: 27.4 KG/M2 | HEIGHT: 69 IN | WEIGHT: 185 LBS | OXYGEN SATURATION: 96 % | HEART RATE: 86 BPM | SYSTOLIC BLOOD PRESSURE: 110 MMHG

## 2023-10-12 VITALS
WEIGHT: 185.19 LBS | RESPIRATION RATE: 18 BRPM | BODY MASS INDEX: 27.43 KG/M2 | TEMPERATURE: 98 F | HEART RATE: 65 BPM | HEIGHT: 69 IN | SYSTOLIC BLOOD PRESSURE: 99 MMHG | DIASTOLIC BLOOD PRESSURE: 67 MMHG | OXYGEN SATURATION: 99 %

## 2023-10-12 DIAGNOSIS — K80.20 CALCULUS OF GALLBLADDER WITHOUT CHOLECYSTITIS WITHOUT OBSTRUCTION: Primary | ICD-10-CM

## 2023-10-12 DIAGNOSIS — R10.11 RUQ PAIN: ICD-10-CM

## 2023-10-12 PROCEDURE — 99212 OFFICE O/P EST SF 10 MIN: CPT | Mod: ,,, | Performed by: FAMILY MEDICINE

## 2023-10-12 PROCEDURE — 1159F MED LIST DOCD IN RCRD: CPT | Mod: CPTII,,, | Performed by: FAMILY MEDICINE

## 2023-10-12 PROCEDURE — 3008F PR BODY MASS INDEX (BMI) DOCUMENTED: ICD-10-PCS | Mod: CPTII,,, | Performed by: FAMILY MEDICINE

## 2023-10-12 PROCEDURE — 3078F PR MOST RECENT DIASTOLIC BLOOD PRESSURE < 80 MM HG: ICD-10-PCS | Mod: CPTII,,, | Performed by: FAMILY MEDICINE

## 2023-10-12 PROCEDURE — 3074F PR MOST RECENT SYSTOLIC BLOOD PRESSURE < 130 MM HG: ICD-10-PCS | Mod: CPTII,,, | Performed by: FAMILY MEDICINE

## 2023-10-12 PROCEDURE — 99212 PR OFFICE/OUTPT VISIT, EST, LEVL II, 10-19 MIN: ICD-10-PCS | Mod: ,,, | Performed by: FAMILY MEDICINE

## 2023-10-12 PROCEDURE — 3008F BODY MASS INDEX DOCD: CPT | Mod: CPTII,,, | Performed by: FAMILY MEDICINE

## 2023-10-12 PROCEDURE — 1159F PR MEDICATION LIST DOCUMENTED IN MEDICAL RECORD: ICD-10-PCS | Mod: CPTII,,, | Performed by: FAMILY MEDICINE

## 2023-10-12 PROCEDURE — 3074F SYST BP LT 130 MM HG: CPT | Mod: CPTII,,, | Performed by: FAMILY MEDICINE

## 2023-10-12 PROCEDURE — 76705 ECHO EXAM OF ABDOMEN: CPT | Mod: TC

## 2023-10-12 PROCEDURE — 3078F DIAST BP <80 MM HG: CPT | Mod: CPTII,,, | Performed by: FAMILY MEDICINE

## 2023-10-12 NOTE — LETTER
October 12, 2023      [facility]  [address]   [place] [address]  Phone: [phone]  Fax: [phone]       Patient: Cristin Webber   YOB: 2000  Date of Visit: 10/12/2023    To Whom It May Concern:    Nichole Webber  was at [facility] on 10/12/2023. The patient may return to work/school on 10- with no restrictions. If you have any questions or concerns, or if I can be of further assistance, please do not hesitate to contact me.    Sincerely,    Sejal [place] LPN

## 2023-10-12 NOTE — PROGRESS NOTES
Subjective     Patient ID: Cristin Webber is a 23 y.o. female.    Chief Complaint: Follow-up (Visit from [facility] ED due to abdominal pain. Need utlra sounds result done this morning at )    Pt. Has gallstones. Ready to see a surgeon.    Follow-up  Associated symptoms include abdominal pain and nausea. Pertinent negatives include no chest pain, congestion, coughing, fatigue, fever, headaches or vomiting.     Review of Systems   Constitutional:  Negative for fatigue and fever.   HENT:  Negative for nasal congestion and dental problem.    Eyes:  Negative for discharge.   Respiratory:  Negative for cough and shortness of breath.    Cardiovascular:  Negative for chest pain.   Gastrointestinal:  Positive for abdominal pain and nausea. Negative for constipation, diarrhea and vomiting.   Genitourinary:  Negative for bladder incontinence, difficulty urinating and hot flashes.   Allergic/Immunologic: Negative for environmental allergies.   Neurological:  Negative for headaches.   Psychiatric/Behavioral:  Negative for behavioral problems and confusion.           Objective     Physical Exam  Vitals and nursing note reviewed.   Constitutional:       Appearance: Normal appearance. She is normal weight.   HENT:      Head: Normocephalic and atraumatic.      Right Ear: Tympanic membrane normal.      Left Ear: Tympanic membrane normal.      Nose: Nose normal.      Mouth/Throat:      Mouth: Mucous membranes are moist.   Eyes:      Extraocular Movements: Extraocular movements intact.      Conjunctiva/sclera: Conjunctivae normal.      Pupils: Pupils are equal, round, and reactive to light.   Cardiovascular:      Rate and Rhythm: Normal rate and regular rhythm.      Pulses: Normal pulses.   Pulmonary:      Effort: Pulmonary effort is normal.      Breath sounds: Normal breath sounds.   Abdominal:      General: Abdomen is flat. Bowel sounds are normal.      Palpations: Abdomen is soft.   Musculoskeletal:         General: Normal range of  motion.      Cervical back: Normal range of motion and neck supple.   Skin:     General: Skin is warm and dry.   Neurological:      General: No focal deficit present.      Mental Status: She is alert and oriented to person, place, and time.   Psychiatric:         Mood and Affect: Mood normal.            Assessment and Plan     1. Calculus of gallbladder without cholecystitis without obstruction        Will refer to surgeon of patient's choice  RTC as needed         No follow-ups on file.

## 2023-10-12 NOTE — ED PROVIDER NOTES
Encounter Date: 10/11/2023       History     Chief Complaint   Patient presents with    Abdominal Pain     Patient presents with right upper quadrant pain that started after eating supper this evening.  Pain is severe and doubles her over.  Pain radiates to her posterior right shoulder.  Pain waxes and wanes.  Patient reports she had a problem with her gallbladder during a pregnancy, still has her gallbladder.      Review of patient's allergies indicates:   Allergen Reactions    Oxycodone Nausea And Vomiting     Past Medical History:   Diagnosis Date    Hypertension      Past Surgical History:   Procedure Laterality Date     SECTION      HAND SURGERY Right     SHOULDER ARTHROSCOPY Right     TONSILLECTOMY, ADENOIDECTOMY       History reviewed. No pertinent family history.  Social History     Tobacco Use    Smoking status: Never    Smokeless tobacco: Never   Substance Use Topics    Alcohol use: Yes    Drug use: Never     Review of Systems   Constitutional:  Positive for appetite change (poor appetite after pain started). Negative for activity change and fever.   HENT: Negative.     Eyes: Negative.    Respiratory: Negative.     Cardiovascular: Negative.    Gastrointestinal:  Positive for abdominal pain (right upper quadrant abdominal), nausea and vomiting. Negative for constipation and diarrhea.   Genitourinary: Negative.  Negative for decreased urine volume, difficulty urinating, dysuria, flank pain, frequency, hematuria, pelvic pain, urgency, vaginal bleeding, vaginal discharge and vaginal pain.   Musculoskeletal: Negative.  Negative for gait problem, joint swelling, neck pain and neck stiffness.   Skin: Negative.    Neurological: Negative.    Hematological: Negative.    Psychiatric/Behavioral: Negative.     All other systems reviewed and are negative.      Physical Exam     Initial Vitals [10/11/23 2240]   BP Pulse Resp Temp SpO2   111/68 81 18 97.9 °F (36.6 °C) 98 %      MAP       --         Physical  Exam    Nursing note and vitals reviewed.  Constitutional: She appears well-developed and well-nourished. She is not diaphoretic. No distress.   HENT:   Right Ear: External ear normal.   Left Ear: External ear normal.   Nose: Nose normal.   Mouth/Throat: Oropharynx is clear and moist. No oropharyngeal exudate.   Eyes: Conjunctivae and EOM are normal. Pupils are equal, round, and reactive to light.   Neck: Neck supple. No JVD present.   Normal range of motion.  Cardiovascular:  Normal rate, regular rhythm, normal heart sounds and intact distal pulses.           No murmur heard.  Pulmonary/Chest: Breath sounds normal. No stridor. No respiratory distress. She has no wheezes. She has no rhonchi. She has no rales.   Abdominal: Abdomen is soft. Bowel sounds are normal. She exhibits no distension. There is abdominal tenderness (patient has exquisite tenderness on palpation right upper quadrant with a positive Boles's sign, has mild right CVA tenderness on percussion.). There is no rebound and no guarding (right upper quadrant guarding).   Musculoskeletal:         General: No tenderness or edema. Normal range of motion.      Cervical back: Normal range of motion and neck supple.     Lymphadenopathy:     She has no cervical adenopathy.   Neurological: She is alert and oriented to person, place, and time. She has normal strength. No cranial nerve deficit. GCS score is 15. GCS eye subscore is 4. GCS verbal subscore is 5. GCS motor subscore is 6.   Skin: Skin is warm and dry. Capillary refill takes less than 2 seconds. No rash noted. No erythema. No pallor.   Psychiatric: She has a normal mood and affect.         Medical Screening Exam   See Full Note    ED Course   Procedures  Labs Reviewed   COMPREHENSIVE METABOLIC PANEL - Abnormal; Notable for the following components:       Result Value    Glucose 109 (*)     AST 12 (*)     All other components within normal limits   LIPASE - Abnormal; Notable for the following components:     Lipase 67 (*)     All other components within normal limits   URINALYSIS, REFLEX TO URINE CULTURE - Abnormal; Notable for the following components:    Nitrites, UA Positive (*)     Protein, UA Trace (*)     Blood, UA Trace-Intact (*)     Specific Gravity, UA >=1.030 (*)     All other components within normal limits   CBC WITH DIFFERENTIAL - Abnormal; Notable for the following components:    Neutrophils % 48.2 (*)     Lymphocytes % 42.3 (*)     Monocytes % 6.8 (*)     All other components within normal limits   URINALYSIS, MICROSCOPIC - Abnormal; Notable for the following components:    WBC, UA 20-50 (*)     Bacteria, UA Many (*)     Squamous Epithelial Cells, UA Moderate (*)     Mucus, UA Moderate (*)     All other components within normal limits   AMYLASE - Normal   HCG QUALITATIVE URINE - Normal   CULTURE, URINE   CBC W/ AUTO DIFFERENTIAL    Narrative:     The following orders were created for panel order CBC auto differential.  Procedure                               Abnormality         Status                     ---------                               -----------         ------                     CBC with Differential[[id number]]       Abnormal            Final result                 Please view results for these tests on the individual orders.          Imaging Results    None          Medications   sodium chloride 0.9% bolus 1,000 mL 1,000 mL (1,000 mLs Intravenous New Bag 10/11/23 2323)   cefTRIAXone (ROCEPHIN) 1 g in dextrose 5 % in water (D5W) 100 mL IVPB (MB+) (1 g Intravenous New Bag 10/11/23 2349)   ondansetron injection 4 mg (4 mg Intravenous Given 10/11/23 2319)   morphine injection 4 mg (4 mg Intravenous Given 10/11/23 2320)   prochlorperazine injection Soln 10 mg (10 mg Intravenous Given 10/11/23 2349)     Medical Decision Making  Differential diagnosis includes acute cholecystitis, cholelithiasis, pancreatitis, gastroenteritis, pyelonephritis, renal lithiasis/ureteral lithiasis, other  intra-abdominal causes for right upper quadrant pain.  Clinically patient most likely has gallbladder disease.    Patient treated with IV fluid, IV morphine and IV Zofran.  Patient reports an allergy to oxycodone, she reports that when she took oral oxycodone she had severe nausea and vomiting.  Patient has tolerated morphine in the past with no problem given IV.    Patient was given Rocephin IV for urinary infection.  Patient to return in the morning when ultrasound is available for further evaluation of the gallbladder with outpatient ultrasound and if abnormal can return to the emergency department or follow-up with her primary physician.    Amount and/or Complexity of Data Reviewed  Labs: ordered. Decision-making details documented in ED Course.    Risk  Prescription drug management.               ED Course as of 10/12/23 0000   Wed Oct 11, 2023   2337 CBC auto differential(!)  CBC shows white count 8.44, normal hemoglobin and hematocrit, normal platelet count. [LM]   2337 Urinalysis, Reflex to Urine Culture Urine, Clean Catch(!)  Urinalysis shows positive nitrites, trace protein, specific gravity greater than 1.030 and trace intact occult blood. [LM]   2337 Urinalysis, Microscopic(!)  Microscopic urinalysis shows 20-50 WBCs per high-power field with 0-3 RBCs, many bacteria noted. [LM]   2337 HCG Qualitative Urine  Urine hCG is negative [LM]   2346 Comprehensive metabolic panel(!)  CMP shows sodium 140 potassium 3.6 chloride 105 CO2 28 glucose is 109 normal BUN and creatinine normal albumin, normal bilirubin, normal liver enzymes, estimated GFR is 82.  [LM]   2346 Lipase(!)  Amylase and lipase are not elevated. [LM]      ED Course User Index  [LM] Seven Yates DO                    Clinical Impression:   Final diagnoses:  [N10] Acute pyelonephritis (Primary)        ED Disposition Condition    Discharge Stable          ED Prescriptions       Medication Sig Dispense Start Date End Date Auth. Provider     cefdinir (OMNICEF) 300 MG capsule Take 1 capsule (300 mg total) by mouth 2 (two) times daily. for 10 days 20 capsule 10/11/2023 10/21/2023 Seven Yates DO    ondansetron (ZOFRAN-ODT) 4 MG TbDL Take 1 tablet (4 mg total) by mouth every 6 (six) hours as needed (Nausea or vomiting). 12 tablet 10/11/2023 10/14/2023 Seven Yates DO          Follow-up Information       Follow up With Specialties Details Why Contact Info    Eagle [place] IV, MD Family Medicine Schedule an appointment as soon as possible for a visit in 1 day To recheck; sooner if worse, not improving, or if any new symptoms. [address] [facility] [address]  [phone]               Seven Yates DO  10/12/23 0000

## 2023-10-14 LAB — UA COMPLETE W REFLEX CULTURE PNL UR: ABNORMAL

## 2023-10-16 ENCOUNTER — OFFICE VISIT (OUTPATIENT)
Dept: SURGERY | Facility: CLINIC | Age: 23
End: 2023-10-16
Payer: COMMERCIAL

## 2023-10-16 DIAGNOSIS — Z01.818 PREPROCEDURAL EXAMINATION: ICD-10-CM

## 2023-10-16 DIAGNOSIS — K80.20 SYMPTOMATIC CHOLELITHIASIS: Primary | ICD-10-CM

## 2023-10-16 PROCEDURE — 1159F MED LIST DOCD IN RCRD: CPT | Mod: ,,, | Performed by: STUDENT IN AN ORGANIZED HEALTH CARE EDUCATION/TRAINING PROGRAM

## 2023-10-16 PROCEDURE — 99204 PR OFFICE/OUTPT VISIT, NEW, LEVL IV, 45-59 MIN: ICD-10-PCS | Mod: S$PBB,,, | Performed by: STUDENT IN AN ORGANIZED HEALTH CARE EDUCATION/TRAINING PROGRAM

## 2023-10-16 PROCEDURE — 1159F PR MEDICATION LIST DOCUMENTED IN MEDICAL RECORD: ICD-10-PCS | Mod: ,,, | Performed by: STUDENT IN AN ORGANIZED HEALTH CARE EDUCATION/TRAINING PROGRAM

## 2023-10-16 PROCEDURE — 99213 OFFICE O/P EST LOW 20 MIN: CPT | Mod: PBBFAC | Performed by: STUDENT IN AN ORGANIZED HEALTH CARE EDUCATION/TRAINING PROGRAM

## 2023-10-16 PROCEDURE — 99204 OFFICE O/P NEW MOD 45 MIN: CPT | Mod: S$PBB,,, | Performed by: STUDENT IN AN ORGANIZED HEALTH CARE EDUCATION/TRAINING PROGRAM

## 2023-10-16 RX ORDER — INDOCYANINE GREEN AND WATER 25 MG
6.25 KIT INJECTION ONCE
Status: CANCELLED | OUTPATIENT
Start: 2023-10-19

## 2023-10-16 RX ORDER — SODIUM CHLORIDE 9 MG/ML
INJECTION, SOLUTION INTRAVENOUS CONTINUOUS
Status: CANCELLED | OUTPATIENT
Start: 2023-10-16

## 2023-10-16 NOTE — PATIENT INSTRUCTIONS
[facility]      Your surgery is scheduled for 10/19/2023 at [facility] on the ground floor of the Ambulatory building. You should arrive at 0630 at the Ambulatory Care Center located at [address].                                                                                                                                                                                                                                                                                                                                                           Preoperative Instructions        Your pre-op lab work will be Today on the 1st floor of the [facility] building.                                                                                                                                             Day of Surgery Instructions      Bring a list of all your medications with you the day of your surgery. You can also give the list to your doctor or nurse during your final clinic appointment before surgery.      Do not eat any solid foods or drink any liquids after 12:00 AM (midnight). This includes gum, hard candy, mints, and chewing tobacco.  Medications: Take any medications specified with a small sip of water the morning of your surgery.  Brush your teeth: You may brush your teeth and rinse your mouth. Do not swallow any water or toothpaste.  Clothing: A button front shirt and loose-fitting clothes are the most comfortable before and after surgery. We also recommend low-heeled shoes.  Hair: Avoid buns, ponytails, or hairpieces at the back of the head. Remove or avoid any clips, pins or bands that bind hair. Do not use hairspray. Before going to surgery, you will need to remove any wigs or hairpieces.  We will cover your hair during surgery. Your privacy regarding personal appearance will be respected.  Fingernails: Please be sure to remove all nail polish before you arrive for  surgery. We understand that tips, wraps, gels, etc., are expensive; however, we ask these products to be removed from at least one finger on each hand. Your fingertips are used to accurately monitor your oxygen level during surgery by a device called an oximeter.  Glasses and Contact Lenses: Wear glasses when possible. If contact lenses must be worn, bring a lens case and solution. If glasses are worn, bring a case for them.  Hearing Aids: If you rely on a hearing aid, wear it to the hospital on the day of surgery. This will ensure you can hear and understand everything we need to communicate with you.  Valuables: Jewelry, including body piercings, Dentures, money, and credit cards should be left at home. [facility] is not responsible for valuables that are not secured in our surgery center.  Makeup, Perfume, Creams, Lotions and Deodorants: Do not use any of these products on the day of surgery. Remove false eyelashes prior to surgery.  Implanted Medical Devices: If you have an implanted device, such as a pacemaker or AICD, bring the device information card (if you have it) with you.  Medical Equipment: If you have been fitted for a brace to wear after surgery or you have been given crutches, bring those with you to the surgery center.  Shower: Take a shower with Hibiclens® (chlorhexidine) (available over the counter). This reduces the chance of infection. PLEASE USE CHLORHEXIDINE WASH THE NIGHT BEFORE SURGERY AND THE MORNING OF SURGERY.      Medication instructions:  You may take blood pressure medication with a small drink of water the morning of surgery.    Stop taking blood thinners 2 days before surgery.    IF YOU ARE ON ANY OF THESE BLOOD THINNERS, MAKE SURE YOUR PHYSICIAN IS AWARE.  Eliquis/Apixaban            Wafarin/Coumadin,Jantoven  Xarelto/Rivaroxaban      Pletal/Cilostazol  Plavix/Clopidogrel          Pradaxa/Dibigatran      If you are diabetic      Follow the diabetic medicine instructions you received  during your pre-operative visit.  DO NOT take your insulin or diabetic medications the morning of surgery.  When you arrive at the surgical center, be sure to tell the nurse you are diabetic.    The following blood sugar medications have to be stopped prior to surgery:    Hold 24 hours prior to surgery:    Libraglutide - Saxenda   Adlyxen - Lixixerutose  Byetta - Exenatide  Saxenda - Liralutide   Victoza    Hold 1 week prior to surgery:    Semaglutide - Ozempic, Wegouy, Rybelsus  Dulaglutide - Trulicity  Tiazepatide - Mounjaro  Bydurcon    Hold 48 hours prior to surgery:    Metformin, Glucovance, Metaglip, Fortamet, Glucophage, Riomet, Avandamet, Glimepiride            Other Items to bring with you and know      Insurance card  Identification card such as 's license, passport, or other picture ID  Copy of your advance directives  List of medications and allergies, if not already provided  Name and phone number of person to contact if your condition changes significantly. YOU CANNOT DRIVE YOURSELF HOME FROM THE HOSPITAL THE DAY OF SURGERY.  PLEASE UNDERSTAND THAT OUR OFFICE DOES NOT GIVE PATHOLOGY RESULTS OR TEST RESULTS OVER THE PHONE. THIS WILL BE DISCUSSED WITH YOU ON YOUR FOLLOW UP APPOINTMENT.          Alcohol and Surgery  We want to help you prepare for and recover from surgery as quickly and safely as possible. Be open and honest with your provider about how many drinks you have per day. Excessive alcohol use is defined as drinking more than three drinks per day. It can affect the outcome of your surgery. Binge drinking (consuming large amounts of alcohol infrequently, such as on weekends) can also affect the outcome of your surgery.  Alcohol withdrawal  If you drink more than three drinks a day, you could have a complication, called alcohol withdrawal, after surgery.  Alcohol withdrawal is a set of symptoms that people have when they suddenly stop drinking after using alcohol  for a long time. During  withdrawal, a person's central nervous system overreacts. This can cause mild symptoms such as shakiness, sweating or hallucinating. It can also cause other more serious side effects. If not treated properly, alcohol withdrawal can cause potentially life-threatening complications after surgery. This can include tremors, seizures, hallucinations, delirium tremors, and even death. Untreated alcohol withdrawal often leads to a longer stay in the hospital, potentially in the Intensive Care Unit.  Chronic heavy drinking also can interfere with several organ systems and biochemical processes in the body.  This interference can cause serious, even life-threatening complications.  Your care team can offer alcohol withdrawal treatment to help:  Decrease the risk of seizures and delirium tremors after surgery  Decrease the risk we will need to restrain you for your own safety or the safety of others  Decrease your risk of falling after surgery  Reduce the use of potent sedative medications  Reduce the time you stay in the hospital after surgery  Reduce the time you might spend on a mechanical ventilator to help you breathe  Lower incidence of organ failure and biochemical complications  Talk to a member of your care team or your primary care physician about your alcohol use if you feel you may be at risk of any of these complications.        Smoking and Surgery  Quitting smoking is extremely important for a successful surgery and recovery. Cigarette smoking compromises your immune system. This increases your risk of an infection after surgery. Quitting the habit before surgery will decrease the surgical risks associated with smoking.

## 2023-10-16 NOTE — H&P (VIEW-ONLY)
History & Physical    SUBJECTIVE:     History of Present Illness:  23-year-old  female presents to the clinic for evaluation for gallstones.  Patient previously went to outside hospital for evaluation for epigastric abdominal pain radiating to the right upper quadrant.  Patient states the pain was worse when eating fatty or greasy meals.  Ultrasound the abdomen performed which showed gallstones, but no evidence of cholecystitis.  Patient was found to have a urinary tract infection and placed on antibiotics.  Patient is sent to surgery clinic for evaluation.  No significant past surgical history.  No significant medical history.  Denies any chest pain/shortness of breath, fever/chills.  Continues to have intermittent nausea and vomiting.    Chief Complaint   Patient presents with    Consult     Patient states she has been having nausea, vomiting, and diarrhea since last thursday       Review of patient's allergies indicates:   Allergen Reactions    Oxycodone Nausea And Vomiting       Current Outpatient Medications   Medication Sig Dispense Refill    busPIRone (BUSPAR) 5 MG Tab Take 5 mg by mouth 2 (two) times daily as needed.      cefdinir (OMNICEF) 300 MG capsule Take 1 capsule (300 mg total) by mouth 2 (two) times daily. for 10 days 20 capsule 0    dextroamphetamine-amphetamine 10 mg Tab Take 1 tablet by mouth 2 (two) times daily.      drospirenone-ethinyl estradioL (CLARA) 3-0.02 mg per tablet Take 1 tablet by mouth once daily. 30 tablet 11    dextromethorphan-guaiFENesin  mg/5 ml (ROBITUSSIN-DM)  mg/5 mL liquid Take 10 mLs by mouth every 6 (six) hours as needed (cough and congestion). (Patient not taking: Reported on 2022) 120 mL 1     No current facility-administered medications for this visit.       Past Medical History:   Diagnosis Date    Hypertension      Past Surgical History:   Procedure Laterality Date     SECTION      HAND SURGERY Right     SHOULDER ARTHROSCOPY Right      TONSILLECTOMY, ADENOIDECTOMY       No family history on file.  Social History     Tobacco Use    Smoking status: Never    Smokeless tobacco: Never   Substance Use Topics    Alcohol use: Yes    Drug use: Never        Review of Systems:  Review of Systems   Constitutional: Negative.  Negative for fatigue and unexpected weight change.   HENT: Negative.  Negative for trouble swallowing.    Eyes: Negative.    Respiratory: Negative.  Negative for chest tightness and shortness of breath.    Cardiovascular: Negative.  Negative for chest pain.   Gastrointestinal:  Positive for abdominal pain, diarrhea, nausea and vomiting. Negative for blood in stool.   Endocrine: Negative.    Genitourinary: Negative.  Negative for hematuria.   Musculoskeletal: Negative.  Negative for back pain and myalgias.   Neurological: Negative.  Negative for dizziness, speech difficulty, weakness and light-headedness.   Psychiatric/Behavioral: Negative.  Negative for agitation and behavioral problems.        OBJECTIVE:     Vital Signs (Most Recent)              Physical Exam:  Physical Exam  Constitutional:       General: She is not in acute distress.     Appearance: Normal appearance.   HENT:      Head: Normocephalic.   Cardiovascular:      Rate and Rhythm: Normal rate.   Pulmonary:      Effort: Pulmonary effort is normal. No respiratory distress.   Abdominal:      General: There is no distension.      Tenderness: There is no abdominal tenderness.   Musculoskeletal:         General: Normal range of motion.   Skin:     General: Skin is warm.      Coloration: Skin is not jaundiced.   Neurological:      General: No focal deficit present.      Mental Status: She is alert and oriented to person, place, and time.      Cranial Nerves: No cranial nerve deficit.       ASSESSMENT/PLAN:     Symptomatic cholelithiasis    PLAN:Plan     We will schedule for robot assisted cholecystectomy.      Risks and benefits explained with the patient including risks for  infection, bleeding, injury to surrounding structures, hematoma/seroma formation with need for possible evacuation, possible open.  The patient verbalized understanding, agrees and wishes to proceed with surgery.    Patient will need a CBC, BMP and pregnancy test prior to surgery.

## 2023-10-18 ENCOUNTER — ANESTHESIA EVENT (OUTPATIENT)
Dept: SURGERY | Facility: HOSPITAL | Age: 23
End: 2023-10-18
Payer: COMMERCIAL

## 2023-10-18 NOTE — ANESTHESIA PREPROCEDURE EVALUATION
10/18/2023  Cristin Webber is a 23 y.o., female.      Pre-op Assessment    I have reviewed the Patient Summary Reports.     I have reviewed the Nursing Notes. I have reviewed the NPO Status.   I have reviewed the Medications.     Review of Systems  Anesthesia Hx:  No problems with previous Anesthesia  Denies Family Hx of Anesthesia complications.   Denies Personal Hx of Anesthesia complications.   Social:  Non-Smoker, Social Alcohol Use    Cardiovascular:   Exercise tolerance: good Hypertension Patient had h/o dysautonomia - states she has periodic episodes of hypotension and passing out - she denies any known cardiac dysrythmia or any known structural cardiac issues   Hepatic/GI:   Symptomatic cholelithiasis   Psych:   Psychiatric History anxiety ADHD         Physical Exam  General: Well nourished, Cooperative and Alert    Airway:  Mallampati: II   Mouth Opening: Normal  TM Distance: Normal  Tongue: Normal  Neck ROM: Normal ROM    Dental:  Intact    Chest/Lungs:  Clear to auscultation, Normal Respiratory Rate    Heart:  Rate: Normal  Rhythm: Regular Rhythm        Chemistry        Component Value Date/Time     10/16/2023 1327    K 4.3 10/16/2023 1327     (H) 10/16/2023 1327    CO2 26 10/16/2023 1327    BUN 14 10/16/2023 1327    CREATININE 0.82 10/16/2023 1327     10/16/2023 1327        Component Value Date/Time    CALCIUM 9.7 10/16/2023 1327    ALKPHOS 53 10/11/2023 2319    AST 12 (L) 10/11/2023 2319    ALT 17 10/11/2023 2319    BILITOT 0.2 10/11/2023 2319        Lab Results   Component Value Date    WBC 8.00 10/16/2023    HGB 14.5 10/16/2023    HCT 43.1 10/16/2023     10/16/2023     Results for orders placed or performed in visit on 03/08/22   EKG 12-lead    Collection Time: 03/08/22  4:11 PM    Narrative    Test Reason : R07.9,    Vent. Rate : 111 BPM     Atrial Rate : 111 BPM     P-R  Int : 156 ms          QRS Dur : 080 ms      QT Int : 336 ms       P-R-T Axes : 067 074 061 degrees     QTc Int : 456 ms    Sinus tachycardia  Septal infarct ,age undetermined  Abnormal ECG  No previous ECGs available  Confirmed by Mango Mars DO ([id number]) on 3/18/2022 12:07:55 PM    Referred By:             Confirmed By:Mango Mars DO         Anesthesia Plan  Type of Anesthesia, risks & benefits discussed:    Anesthesia Type: Gen ETT  Intra-op Monitoring Plan: Standard ASA Monitors  Post Op Pain Control Plan: multimodal analgesia  Induction:  IV  Airway Plan: Direct, Post-Induction  Informed Consent: Informed consent signed with the Patient and all parties understand the risks and agree with anesthesia plan.  All questions answered.   ASA Score: 2  Day of Surgery Review of History & Physical: H&P Update referred to the surgeon/provider.I have interviewed and examined the patient. I have reviewed the patient's H&P dated: There are no significant changes.     Ready For Surgery From Anesthesia Perspective.     .

## 2023-10-19 ENCOUNTER — HOSPITAL ENCOUNTER (OUTPATIENT)
Facility: HOSPITAL | Age: 23
Discharge: HOME | End: 2023-10-19
Attending: STUDENT IN AN ORGANIZED HEALTH CARE EDUCATION/TRAINING PROGRAM | Admitting: STUDENT IN AN ORGANIZED HEALTH CARE EDUCATION/TRAINING PROGRAM
Payer: COMMERCIAL

## 2023-10-19 ENCOUNTER — ANESTHESIA (OUTPATIENT)
Dept: SURGERY | Facility: HOSPITAL | Age: 23
End: 2023-10-19
Payer: COMMERCIAL

## 2023-10-19 VITALS
RESPIRATION RATE: 18 BRPM | SYSTOLIC BLOOD PRESSURE: 113 MMHG | TEMPERATURE: 98 F | HEIGHT: 69 IN | DIASTOLIC BLOOD PRESSURE: 62 MMHG | OXYGEN SATURATION: 99 % | BODY MASS INDEX: 26.66 KG/M2 | WEIGHT: 180 LBS | HEART RATE: 76 BPM

## 2023-10-19 DIAGNOSIS — K80.20 SYMPTOMATIC CHOLELITHIASIS: Primary | ICD-10-CM

## 2023-10-19 PROCEDURE — 63600175 PHARM REV CODE 636 W HCPCS: Performed by: STUDENT IN AN ORGANIZED HEALTH CARE EDUCATION/TRAINING PROGRAM

## 2023-10-19 PROCEDURE — 71000015 HC POSTOP RECOV 1ST HR: Performed by: STUDENT IN AN ORGANIZED HEALTH CARE EDUCATION/TRAINING PROGRAM

## 2023-10-19 PROCEDURE — 27000165 HC TUBE, ETT CUFFED: Performed by: ANESTHESIOLOGY

## 2023-10-19 PROCEDURE — D9220A PRA ANESTHESIA: Mod: ANES,,, | Performed by: ANESTHESIOLOGY

## 2023-10-19 PROCEDURE — 25000003 PHARM REV CODE 250: Performed by: STUDENT IN AN ORGANIZED HEALTH CARE EDUCATION/TRAINING PROGRAM

## 2023-10-19 PROCEDURE — 27000689 HC BLADE LARYNGOSCOPE ANY SIZE: Performed by: ANESTHESIOLOGY

## 2023-10-19 PROCEDURE — 71000033 HC RECOVERY, INTIAL HOUR: Performed by: STUDENT IN AN ORGANIZED HEALTH CARE EDUCATION/TRAINING PROGRAM

## 2023-10-19 PROCEDURE — 47562 PR LAP,CHOLECYSTECTOMY: ICD-10-PCS | Mod: ,,, | Performed by: STUDENT IN AN ORGANIZED HEALTH CARE EDUCATION/TRAINING PROGRAM

## 2023-10-19 PROCEDURE — 63600175 PHARM REV CODE 636 W HCPCS: Performed by: NURSE ANESTHETIST, CERTIFIED REGISTERED

## 2023-10-19 PROCEDURE — 25000003 PHARM REV CODE 250: Performed by: NURSE ANESTHETIST, CERTIFIED REGISTERED

## 2023-10-19 PROCEDURE — 36000710: Performed by: STUDENT IN AN ORGANIZED HEALTH CARE EDUCATION/TRAINING PROGRAM

## 2023-10-19 PROCEDURE — 37000009 HC ANESTHESIA EA ADD 15 MINS: Performed by: STUDENT IN AN ORGANIZED HEALTH CARE EDUCATION/TRAINING PROGRAM

## 2023-10-19 PROCEDURE — 37000008 HC ANESTHESIA 1ST 15 MINUTES: Performed by: STUDENT IN AN ORGANIZED HEALTH CARE EDUCATION/TRAINING PROGRAM

## 2023-10-19 PROCEDURE — D9220A PRA ANESTHESIA: ICD-10-PCS | Mod: ANES,,, | Performed by: ANESTHESIOLOGY

## 2023-10-19 PROCEDURE — 27201423 OPTIME MED/SURG SUP & DEVICES STERILE SUPPLY: Performed by: STUDENT IN AN ORGANIZED HEALTH CARE EDUCATION/TRAINING PROGRAM

## 2023-10-19 PROCEDURE — D9220A PRA ANESTHESIA: ICD-10-PCS | Mod: CRNA,,, | Performed by: NURSE ANESTHETIST, CERTIFIED REGISTERED

## 2023-10-19 PROCEDURE — 47562 LAPAROSCOPIC CHOLECYSTECTOMY: CPT | Mod: ,,, | Performed by: STUDENT IN AN ORGANIZED HEALTH CARE EDUCATION/TRAINING PROGRAM

## 2023-10-19 PROCEDURE — 71000016 HC POSTOP RECOV ADDL HR: Performed by: STUDENT IN AN ORGANIZED HEALTH CARE EDUCATION/TRAINING PROGRAM

## 2023-10-19 PROCEDURE — 36000711: Performed by: STUDENT IN AN ORGANIZED HEALTH CARE EDUCATION/TRAINING PROGRAM

## 2023-10-19 PROCEDURE — 27000655: Performed by: ANESTHESIOLOGY

## 2023-10-19 PROCEDURE — D9220A PRA ANESTHESIA: Mod: CRNA,,, | Performed by: NURSE ANESTHETIST, CERTIFIED REGISTERED

## 2023-10-19 PROCEDURE — 27000716 HC OXISENSOR PROBE, ANY SIZE: Performed by: ANESTHESIOLOGY

## 2023-10-19 PROCEDURE — 88304 SURGICAL PATHOLOGY: ICD-10-PCS | Mod: 26,,, | Performed by: PATHOLOGY

## 2023-10-19 PROCEDURE — 88304 TISSUE EXAM BY PATHOLOGIST: CPT | Mod: TC,SUR | Performed by: STUDENT IN AN ORGANIZED HEALTH CARE EDUCATION/TRAINING PROGRAM

## 2023-10-19 PROCEDURE — 88304 TISSUE EXAM BY PATHOLOGIST: CPT | Mod: 26,,, | Performed by: PATHOLOGY

## 2023-10-19 PROCEDURE — 25000003 PHARM REV CODE 250: Performed by: ANESTHESIOLOGY

## 2023-10-19 PROCEDURE — 63600175 PHARM REV CODE 636 W HCPCS: Performed by: ANESTHESIOLOGY

## 2023-10-19 RX ORDER — DIPHENHYDRAMINE HYDROCHLORIDE 50 MG/ML
25 INJECTION INTRAMUSCULAR; INTRAVENOUS EVERY 6 HOURS PRN
Status: DISCONTINUED | OUTPATIENT
Start: 2023-10-19 | End: 2023-10-19 | Stop reason: HOSPADM

## 2023-10-19 RX ORDER — DOCUSATE SODIUM 100 MG/1
100 CAPSULE, LIQUID FILLED ORAL 2 TIMES DAILY
Qty: 28 CAPSULE | Refills: 0 | Status: SHIPPED | OUTPATIENT
Start: 2023-10-19 | End: 2024-01-09

## 2023-10-19 RX ORDER — LIDOCAINE HYDROCHLORIDE 20 MG/ML
INJECTION, SOLUTION EPIDURAL; INFILTRATION; INTRACAUDAL; PERINEURAL
Status: DISCONTINUED | OUTPATIENT
Start: 2023-10-19 | End: 2023-10-19

## 2023-10-19 RX ORDER — SODIUM CHLORIDE 9 MG/ML
INJECTION, SOLUTION INTRAVENOUS CONTINUOUS
Status: DISCONTINUED | OUTPATIENT
Start: 2023-10-19 | End: 2023-10-19 | Stop reason: HOSPADM

## 2023-10-19 RX ORDER — BUPIVACAINE HYDROCHLORIDE 2.5 MG/ML
INJECTION, SOLUTION EPIDURAL; INFILTRATION; INTRACAUDAL
Status: DISCONTINUED | OUTPATIENT
Start: 2023-10-19 | End: 2023-10-19 | Stop reason: HOSPADM

## 2023-10-19 RX ORDER — DEXAMETHASONE SODIUM PHOSPHATE 4 MG/ML
INJECTION, SOLUTION INTRA-ARTICULAR; INTRALESIONAL; INTRAMUSCULAR; INTRAVENOUS; SOFT TISSUE
Status: DISCONTINUED | OUTPATIENT
Start: 2023-10-19 | End: 2023-10-19

## 2023-10-19 RX ORDER — HYDROMORPHONE HYDROCHLORIDE 2 MG/ML
0.5 INJECTION, SOLUTION INTRAMUSCULAR; INTRAVENOUS; SUBCUTANEOUS EVERY 5 MIN PRN
Status: DISCONTINUED | OUTPATIENT
Start: 2023-10-19 | End: 2023-10-19 | Stop reason: HOSPADM

## 2023-10-19 RX ORDER — MEPERIDINE HYDROCHLORIDE 25 MG/ML
25 INJECTION INTRAMUSCULAR; INTRAVENOUS; SUBCUTANEOUS ONCE AS NEEDED
Status: DISCONTINUED | OUTPATIENT
Start: 2023-10-19 | End: 2023-10-19 | Stop reason: HOSPADM

## 2023-10-19 RX ORDER — ROCURONIUM BROMIDE 10 MG/ML
INJECTION, SOLUTION INTRAVENOUS
Status: DISCONTINUED | OUTPATIENT
Start: 2023-10-19 | End: 2023-10-19

## 2023-10-19 RX ORDER — DIMENHYDRINATE 50 MG
50 TABLET ORAL ONCE
Status: COMPLETED | OUTPATIENT
Start: 2023-10-19 | End: 2023-10-19

## 2023-10-19 RX ORDER — KETOROLAC TROMETHAMINE 30 MG/ML
INJECTION, SOLUTION INTRAMUSCULAR; INTRAVENOUS
Status: DISCONTINUED | OUTPATIENT
Start: 2023-10-19 | End: 2023-10-19

## 2023-10-19 RX ORDER — HYDROMORPHONE HYDROCHLORIDE 2 MG/ML
INJECTION, SOLUTION INTRAMUSCULAR; INTRAVENOUS; SUBCUTANEOUS
Status: DISCONTINUED | OUTPATIENT
Start: 2023-10-19 | End: 2023-10-19

## 2023-10-19 RX ORDER — FENTANYL CITRATE 50 UG/ML
INJECTION, SOLUTION INTRAMUSCULAR; INTRAVENOUS
Status: DISCONTINUED | OUTPATIENT
Start: 2023-10-19 | End: 2023-10-19

## 2023-10-19 RX ORDER — ONDANSETRON 2 MG/ML
INJECTION INTRAMUSCULAR; INTRAVENOUS
Status: DISCONTINUED | OUTPATIENT
Start: 2023-10-19 | End: 2023-10-19

## 2023-10-19 RX ORDER — ONDANSETRON 2 MG/ML
4 INJECTION INTRAMUSCULAR; INTRAVENOUS DAILY PRN
Status: DISCONTINUED | OUTPATIENT
Start: 2023-10-19 | End: 2023-10-19 | Stop reason: HOSPADM

## 2023-10-19 RX ORDER — MIDAZOLAM HYDROCHLORIDE 1 MG/ML
INJECTION INTRAMUSCULAR; INTRAVENOUS
Status: DISCONTINUED | OUTPATIENT
Start: 2023-10-19 | End: 2023-10-19

## 2023-10-19 RX ORDER — PROPOFOL 10 MG/ML
VIAL (ML) INTRAVENOUS
Status: DISCONTINUED | OUTPATIENT
Start: 2023-10-19 | End: 2023-10-19

## 2023-10-19 RX ORDER — INDOCYANINE GREEN AND WATER 25 MG
6.25 KIT INJECTION ONCE
Status: COMPLETED | OUTPATIENT
Start: 2023-10-19 | End: 2023-10-19

## 2023-10-19 RX ORDER — HYDROCODONE BITARTRATE AND ACETAMINOPHEN 5; 325 MG/1; MG/1
1 TABLET ORAL EVERY 6 HOURS PRN
Qty: 15 TABLET | Refills: 0 | Status: SHIPPED | OUTPATIENT
Start: 2023-10-19 | End: 2023-10-26

## 2023-10-19 RX ADMIN — DIMENHYDRINATE 50 MG: 50 TABLET ORAL at 07:10

## 2023-10-19 RX ADMIN — CEFAZOLIN 2 G: 1 INJECTION, POWDER, FOR SOLUTION INTRAMUSCULAR; INTRAVENOUS; PARENTERAL at 07:10

## 2023-10-19 RX ADMIN — FENTANYL CITRATE 50 MCG: 50 INJECTION INTRAMUSCULAR; INTRAVENOUS at 07:10

## 2023-10-19 RX ADMIN — PROPOFOL 180 MG: 10 INJECTION, EMULSION INTRAVENOUS at 07:10

## 2023-10-19 RX ADMIN — SODIUM CHLORIDE: 9 INJECTION, SOLUTION INTRAVENOUS at 06:10

## 2023-10-19 RX ADMIN — HYDROMORPHONE HYDROCHLORIDE 0.5 MG: 2 INJECTION, SOLUTION INTRAMUSCULAR; INTRAVENOUS; SUBCUTANEOUS at 08:10

## 2023-10-19 RX ADMIN — MIDAZOLAM 2 MG: 1 INJECTION INTRAMUSCULAR; INTRAVENOUS at 07:10

## 2023-10-19 RX ADMIN — ONDANSETRON 4 MG: 2 INJECTION INTRAMUSCULAR; INTRAVENOUS at 07:10

## 2023-10-19 RX ADMIN — INDOCYANINE GREEN AND WATER 6.25 MG: KIT at 06:10

## 2023-10-19 RX ADMIN — HYDROMORPHONE HYDROCHLORIDE 1 MG: 2 INJECTION, SOLUTION INTRAMUSCULAR; INTRAVENOUS; SUBCUTANEOUS at 09:10

## 2023-10-19 RX ADMIN — SUGAMMADEX 200 MG: 100 INJECTION, SOLUTION INTRAVENOUS at 09:10

## 2023-10-19 RX ADMIN — KETOROLAC TROMETHAMINE 30 MG: 30 INJECTION, SOLUTION INTRAMUSCULAR at 09:10

## 2023-10-19 RX ADMIN — ONDANSETRON 4 MG: 2 INJECTION INTRAMUSCULAR; INTRAVENOUS at 10:10

## 2023-10-19 RX ADMIN — SODIUM CHLORIDE, POTASSIUM CHLORIDE, SODIUM LACTATE AND CALCIUM CHLORIDE: 600; 310; 30; 20 INJECTION, SOLUTION INTRAVENOUS at 09:10

## 2023-10-19 RX ADMIN — DEXAMETHASONE SODIUM PHOSPHATE 8 MG: 4 INJECTION, SOLUTION INTRA-ARTICULAR; INTRALESIONAL; INTRAMUSCULAR; INTRAVENOUS; SOFT TISSUE at 07:10

## 2023-10-19 RX ADMIN — LIDOCAINE HYDROCHLORIDE 50 MG: 20 INJECTION, SOLUTION INTRAVENOUS at 07:10

## 2023-10-19 RX ADMIN — ROCURONIUM BROMIDE 50 MG: 10 INJECTION, SOLUTION INTRAVENOUS at 07:10

## 2023-10-19 NOTE — DISCHARGE INSTRUCTIONS
Sugammadex given in surgery may decrease the effectiveness of your birth control for up to 7 days.

## 2023-10-19 NOTE — BRIEF OP NOTE
[facility] - Periop Services  Brief Operative Note    Surgery Date: 10/19/2023     Surgeon(s) and Role:     * Luc Yeboah,  - Primary    Assisting Surgeon: None    Pre-op Diagnosis:  Symptomatic cholelithiasis [K80.20]    Post-op Diagnosis:  Post-Op Diagnosis Codes:     * Symptomatic cholelithiasis [K80.20]    Procedure(s) (LRB):  XI ROBOTIC CHOLECYSTECTOMY (N/A)    Anesthesia: General    Operative Findings:  Distended gallbladder    Estimated Blood Loss: 5cc         Specimens:   Specimen (24h ago, onward)       Start     Ordered    10/19/23 0903  Surgical Pathology  RELEASE UPON ORDERING         10/19/23 0903                      Discharge Note    OUTCOME: Patient tolerated treatment/procedure well without complication and is now ready for discharge.    DISPOSITION: Home or Self Care    FINAL DIAGNOSIS:  Symptomatic cholelithiasis    FOLLOWUP: In clinic    DISCHARGE INSTRUCTIONS:    Discharge Procedure Orders   Diet Adult Regular     Lifting restrictions   Order Comments: A lifting over 20 lb for the next 2 weeks      Remove dressing in 72 hours   Order Comments: Okay to shower over dressing starting tomorrow.  Remove Band-Aids in 72 hours.  Leave strips; I will remove strips in clinic in 2 weeks.  No swimming or tub baths     Activity as tolerated

## 2023-10-19 NOTE — OR NURSING
0927 Received to pacu in stable condition with SFM @ 6lpm.    0942 Room air    0950 Dr. Walton in room checking on patient.     1010 Transferred to OP room 16 in stable condition on RA.  BS report to   JORDY Nazario RN @ 1015.  VSS @  120/59, 97%RA, 112HR, 12RR.  Family at .

## 2023-10-19 NOTE — OP NOTE
[facility] - Periop Services  Surgery Department  Operative Note    SUMMARY     Date of Procedure: 10/19/2023     Procedure: Procedure(s) (LRB):  XI ROBOTIC CHOLECYSTECTOMY (N/A)     Surgeon(s) and Role:     * Luc Yeboah DO - Primary    Assisting Surgeon: None    Pre-Operative Diagnosis: Symptomatic cholelithiasis [K80.20]    Post-Operative Diagnosis: Post-Op Diagnosis Codes:     * Symptomatic cholelithiasis [K80.20]    Anesthesia: General    Operative Findings (including complications, if any):  Distended gallbladder    Description of Technical Procedures:  Patient was taken the operating room and placed on the operating table in the supine position.  Patient underwent general anesthesia per the anesthesia team.  The abdomen was then prepped and draped in usual sterile fashion.  An incision was made in left upper quadrant a Veress needle was inserted and the abdomen was insufflated to 15 mmHg; patient tolerated insufflation well.  An 8 mm trocar was placed in the left abdomen under visualization with the laparoscoped; no injuries identified.  Then a 12 mm trocar was placed at the umbilicus, followed by 2 8 mm trocars in the right upper quadrant; all done under visualization.  We placed a Ray-Gabriela sponge in the abdomen and then docked the robot.  We elevated the liver the gallbladder.  We used ICG and identify the cystic duct and the common bile duct.  We then dissected out the cystic duct and the cystic artery and found these 2 structures going to the gallbladder with the liver bed behind these structures, thus obtaining the critical view.  We doubly clipped and transected the structures and then took the gallbladder off the liver with hook cautery without difficulty.  We removed the Ray-Gabriela sponge and the gallbladder from the umbilical site with the Endo-Catch bag.  Robot was undocked.  We evaluated the gallbladder fossa; all bleeding controlled with electrocautery.  We then desufflated the abdomen  and removed all trocars.  Skin incisions were approximated with a 4-0 Monocryl deep dermal suture.  The skin was cleaned and dried, Mastisol Steri-Strips applied.  Patient was awakened, extubated and taken the PACU in stable condition.  Patient tolerated procedure well.        Estimated Blood Loss (EBL): 5cc           Implants: * No implants in log *    Specimens:   Specimen (24h ago, onward)       Start     Ordered    10/19/23 0903  Surgical Pathology  RELEASE UPON ORDERING         10/19/23 0903                            Condition: Good    Disposition: PACU - hemodynamically stable.    Attestation: I was present and scrubbed for the entire procedure.

## 2023-10-19 NOTE — ANESTHESIA POSTPROCEDURE EVALUATION
Anesthesia Post Evaluation    Patient: Cristin Akbar    Procedure(s) Performed: Procedure(s) (LRB):  XI ROBOTIC CHOLECYSTECTOMY (N/A)    Final Anesthesia Type: general      Patient location during evaluation: PACU  Patient participation: Yes- Able to Participate  Level of consciousness: awake and alert and oriented  Post-procedure vital signs: reviewed and stable  Pain management: adequate  Airway patency: patent  JOSHUA mitigation strategies: Multimodal analgesia  PONV status at discharge: No PONV  Anesthetic complications: no      Cardiovascular status: hemodynamically stable  Respiratory status: unassisted and spontaneous ventilation  Hydration status: euvolemic  Follow-up not needed.          Vitals Value Taken Time   /66 10/19/23 0944   Temp 36.9 °C (98.4 °F) 10/19/23 0932   Pulse 114 10/19/23 0944   Resp 16 10/19/23 0944   SpO2 99 % 10/19/23 0944   Vitals shown include unvalidated device data.      No case tracking events are documented in the log.      Pain/Eduardo Score: Eduardo Score: 8 (10/19/2023  9:27 AM)

## 2023-10-19 NOTE — TRANSFER OF CARE
"Anesthesia Transfer of Care Note    Patient: Cristin Akbar    Procedure(s) Performed: Procedure(s) (LRB):  XI ROBOTIC CHOLECYSTECTOMY (N/A)    Patient location: PACU    Anesthesia Type: general    Transport from OR: Transported from OR on 6-10 L/min O2 by face mask with adequate spontaneous ventilation    Post pain: adequate analgesia    Post assessment: no apparent anesthetic complications    Post vital signs: stable    Level of consciousness: responds to stimulation and awake    Nausea/Vomiting: no nausea/vomiting    Complications: none    Transfer of care protocol was followedComments: Good SV continue, NAD noted, VSS, RTRN      Last vitals:   Visit Vitals  /67   Pulse (!) 126   Temp 36.9 °C (98.4 °F)   Resp 14   Ht 5' 9" (1.753 m)   Wt 81.6 kg (180 lb)   LMP 10/05/2023 (Exact Date)   SpO2 100%   Breastfeeding No   BMI 26.58 kg/m²     "

## 2023-10-19 NOTE — ANESTHESIA PROCEDURE NOTES
Intubation    Date/Time: 10/19/2023 7:42 AM    Performed by: Rojelio Ponce CRNA  Authorized by: Braydon Walton DO    Intubation:     Induction:  Intravenous    Intubated:  Postinduction    Mask Ventilation:  Easy mask    Attempts:  1    Attempted By:  CRNA    Method of Intubation:  Direct    Blade:  Rao 2    Laryngeal View Grade: Grade I - full view of cords      Difficult Airway Encountered?: No      Complications:  None    Airway Device:  Oral endotracheal tube    Airway Device Size:  7.0    Style/Cuff Inflation:  Cuffed (inflated to minimal occlusive pressure)    Inflation Amount (mL):  8    Tube secured:  21    Secured at:  The lips    Placement Verified By:  Capnometry    Complicating Factors:  None    Findings Post-Intubation:  BS equal bilateral

## 2023-10-20 LAB
ESTROGEN SERPL-MCNC: NORMAL PG/ML
INSULIN SERPL-ACNC: NORMAL U[IU]/ML
LAB AP GROSS DESCRIPTION: NORMAL
LAB AP LABORATORY NOTES: NORMAL
T3RU NFR SERPL: NORMAL %

## 2023-10-26 ENCOUNTER — TELEPHONE (OUTPATIENT)
Dept: SURGERY | Facility: CLINIC | Age: 23
End: 2023-10-26
Payer: COMMERCIAL

## 2023-10-26 RX ORDER — MUPIROCIN 20 MG/G
OINTMENT TOPICAL DAILY
Qty: 30 G | Refills: 12 | Status: SHIPPED | OUTPATIENT
Start: 2023-10-26 | End: 2024-01-09

## 2023-10-26 NOTE — TELEPHONE ENCOUNTER
----- Message from Anisha Shrestha sent at 10/26/2023  8:13 AM CDT -----  Thinks one of her incisions is infected. Call back # [phone]

## 2023-10-26 NOTE — TELEPHONE ENCOUNTER
Spoke with pt. Instructed to use Mupirocin ointment Dr. Yeboah prescribed and to follow up with Dr. Yeboah 10/27/2023 for further evaluation of incision. Pt voiced understanding.

## 2023-10-26 NOTE — TELEPHONE ENCOUNTER
Spoke with pt, pt states her incision had opened a little with yellowish-greenish drainage. Pt states incision is itching and red around umbilical incision. Instructed pt to keep the open incision covered and I will discuss further instructions with Dr. Yeboah.

## 2023-11-07 ENCOUNTER — OFFICE VISIT (OUTPATIENT)
Dept: SURGERY | Facility: CLINIC | Age: 23
End: 2023-11-07
Payer: COMMERCIAL

## 2023-11-07 DIAGNOSIS — K80.20 SYMPTOMATIC CHOLELITHIASIS: Primary | ICD-10-CM

## 2023-11-07 PROCEDURE — 99024 POSTOP FOLLOW-UP VISIT: CPT | Mod: ,,, | Performed by: STUDENT IN AN ORGANIZED HEALTH CARE EDUCATION/TRAINING PROGRAM

## 2023-11-07 PROCEDURE — 99213 OFFICE O/P EST LOW 20 MIN: CPT | Mod: PBBFAC | Performed by: STUDENT IN AN ORGANIZED HEALTH CARE EDUCATION/TRAINING PROGRAM

## 2023-11-07 PROCEDURE — 99024 PR POST-OP FOLLOW-UP VISIT: ICD-10-PCS | Mod: ,,, | Performed by: STUDENT IN AN ORGANIZED HEALTH CARE EDUCATION/TRAINING PROGRAM

## 2023-11-07 PROCEDURE — 1159F PR MEDICATION LIST DOCUMENTED IN MEDICAL RECORD: ICD-10-PCS | Mod: ,,, | Performed by: STUDENT IN AN ORGANIZED HEALTH CARE EDUCATION/TRAINING PROGRAM

## 2023-11-07 PROCEDURE — 1159F MED LIST DOCD IN RCRD: CPT | Mod: ,,, | Performed by: STUDENT IN AN ORGANIZED HEALTH CARE EDUCATION/TRAINING PROGRAM

## 2023-11-08 NOTE — PROGRESS NOTES
23-year-old  female presents the clinic for postop evaluation status post robotic cholecystectomy.  Patient doing well, no complaints.  No pain.  Tolerating diet and having normal bowel function.      Incisions clean dry and intact and healing well, no signs of infection or drainage.  Incision at the navel area with very minimal erythema, no fluctuance or drainage.      Okay to place triple antibiotic ointment on the umbilical incision for 7 days and then stop.  No need for further follow-up unless there are any problems.  Pathology showed chronic cholecystitis and gallstones.

## 2023-11-15 DIAGNOSIS — Z30.430 ENCOUNTER FOR INSERTION OF MIRENA IUD: Primary | ICD-10-CM

## 2024-01-04 ENCOUNTER — PROCEDURE VISIT (OUTPATIENT)
Dept: OBSTETRICS AND GYNECOLOGY | Facility: CLINIC | Age: 24
End: 2024-01-04
Payer: COMMERCIAL

## 2024-01-04 VITALS
HEART RATE: 80 BPM | HEIGHT: 69 IN | SYSTOLIC BLOOD PRESSURE: 110 MMHG | OXYGEN SATURATION: 100 % | RESPIRATION RATE: 19 BRPM | DIASTOLIC BLOOD PRESSURE: 70 MMHG | WEIGHT: 178 LBS | BODY MASS INDEX: 26.36 KG/M2

## 2024-01-04 DIAGNOSIS — Z12.4 SCREENING FOR CERVICAL CANCER: ICD-10-CM

## 2024-01-04 DIAGNOSIS — Z30.430 ENCOUNTER FOR INSERTION OF INTRAUTERINE CONTRACEPTIVE DEVICE (IUD): ICD-10-CM

## 2024-01-04 DIAGNOSIS — Z30.430 ENCOUNTER FOR INSERTION OF MIRENA IUD: Primary | ICD-10-CM

## 2024-01-04 PROCEDURE — 99999PBSHW PR PBB SHADOW TECHNICAL ONLY FILED TO HB: Mod: PBBFAC,,,

## 2024-01-04 PROCEDURE — 58300 INSERT INTRAUTERINE DEVICE: CPT | Mod: PBBFAC | Performed by: ADVANCED PRACTICE MIDWIFE

## 2024-01-04 PROCEDURE — 99203 OFFICE O/P NEW LOW 30 MIN: CPT | Mod: S$PBB,25,, | Performed by: ADVANCED PRACTICE MIDWIFE

## 2024-01-04 PROCEDURE — 88142 CYTOPATH C/V THIN LAYER: CPT | Mod: TC,GCY | Performed by: ADVANCED PRACTICE MIDWIFE

## 2024-01-04 RX ADMIN — LEVONORGESTREL 1 EACH: 52 INTRAUTERINE DEVICE INTRAUTERINE at 10:01

## 2024-01-04 NOTE — PROGRESS NOTES
Subjective     Patient ID: Cristin Webber is a 23 y.o. female.    Chief Complaint: Contraception (In for IUD insertion. Denies any SA in the last 2 weeks.)    In for contraception mgt and requesting Mirena IUD insertion  Pt has had Mirena in the past prior to delivery and did well with this  Denies SA x greater than 2 weeks  Has never had a pap  Pap collected today.   UPT negative.         Review of Systems   Constitutional: Negative.    HENT: Negative.     Eyes: Negative.    Respiratory: Negative.     Cardiovascular: Negative.    Gastrointestinal: Negative.    Endocrine: Negative.    Genitourinary: Negative.    Musculoskeletal: Negative.    Integumentary:  Negative.   Allergic/Immunologic: Negative.    Neurological: Negative.    Psychiatric/Behavioral: Negative.            Objective     Physical Exam  Vitals reviewed. Exam conducted with a chaperone present.   Constitutional:       Appearance: Normal appearance.   HENT:      Head: Normocephalic.   Cardiovascular:      Rate and Rhythm: Normal rate and regular rhythm.   Pulmonary:      Effort: Pulmonary effort is normal.      Breath sounds: Normal breath sounds.   Chest:   Breasts:     Right: Normal. No mass.      Left: Normal. No mass.   Abdominal:      General: Abdomen is flat.      Palpations: Abdomen is soft.   Genitourinary:     General: Normal vulva.      Exam position: Lithotomy position.      Vagina: Normal.      Cervix: No cervical motion tenderness.      Uterus: Normal. Not tender.       Adnexa: Right adnexa normal and left adnexa normal.        Right: No mass.          Left: No mass.     Musculoskeletal:         General: Normal range of motion.      Cervical back: Normal range of motion.   Skin:     General: Skin is warm and dry.   Neurological:      Mental Status: She is alert and oriented to person, place, and time.   Psychiatric:         Mood and Affect: Mood normal.         Behavior: Behavior normal.            Assessment and Plan     1. Encounter for  insertion of mirena IUD    2. Screening for cervical cancer  -     ThinPrep Pap Test    3. Encounter for insertion of intrauterine contraceptive device (IUD)      Discussed R/B/A, effects and poss side effects and pt wishes to proceed.        Follow up in about 6 weeks (around 2/15/2024), or if symptoms worsen or fail to improve, for IUD string check.

## 2024-01-06 LAB
GH SERPL-MCNC: NORMAL NG/ML
INSULIN SERPL-ACNC: NORMAL U[IU]/ML
LAB AP CLINICAL INFORMATION: NORMAL
LAB AP GYN INTERPRETATION: NEGATIVE
LAB AP PAP DISCLAIMER COMMENTS: NORMAL
RENIN PLAS-CCNC: NORMAL NG/ML/H

## 2024-02-15 ENCOUNTER — OFFICE VISIT (OUTPATIENT)
Dept: OBSTETRICS AND GYNECOLOGY | Facility: CLINIC | Age: 24
End: 2024-02-15
Payer: COMMERCIAL

## 2024-02-15 VITALS
BODY MASS INDEX: 26.66 KG/M2 | SYSTOLIC BLOOD PRESSURE: 122 MMHG | HEIGHT: 69 IN | OXYGEN SATURATION: 99 % | HEART RATE: 95 BPM | WEIGHT: 180 LBS | DIASTOLIC BLOOD PRESSURE: 77 MMHG

## 2024-02-15 DIAGNOSIS — Z30.431 ENCOUNTER FOR ROUTINE CHECKING OF INTRAUTERINE CONTRACEPTIVE DEVICE (IUD): Primary | ICD-10-CM

## 2024-02-15 PROCEDURE — 3078F DIAST BP <80 MM HG: CPT | Mod: ,,, | Performed by: ADVANCED PRACTICE MIDWIFE

## 2024-02-15 PROCEDURE — 99459 PELVIC EXAMINATION: CPT | Mod: PBBFAC | Performed by: ADVANCED PRACTICE MIDWIFE

## 2024-02-15 PROCEDURE — 3074F SYST BP LT 130 MM HG: CPT | Mod: ,,, | Performed by: ADVANCED PRACTICE MIDWIFE

## 2024-02-15 PROCEDURE — 99459 PELVIC EXAMINATION: CPT | Mod: S$PBB,,, | Performed by: ADVANCED PRACTICE MIDWIFE

## 2024-02-15 PROCEDURE — 99213 OFFICE O/P EST LOW 20 MIN: CPT | Mod: S$PBB,,, | Performed by: ADVANCED PRACTICE MIDWIFE

## 2024-02-15 PROCEDURE — 3008F BODY MASS INDEX DOCD: CPT | Mod: ,,, | Performed by: ADVANCED PRACTICE MIDWIFE

## 2024-02-15 PROCEDURE — 99213 OFFICE O/P EST LOW 20 MIN: CPT | Mod: PBBFAC | Performed by: ADVANCED PRACTICE MIDWIFE

## 2024-02-15 PROCEDURE — 1159F MED LIST DOCD IN RCRD: CPT | Mod: ,,, | Performed by: ADVANCED PRACTICE MIDWIFE

## 2024-02-15 NOTE — PROGRESS NOTES
Subjective     Patient ID: Cristin Webber is a 23 y.o. female.    Chief Complaint: Follow-up (6wk iud check )    IUD check  Denies any complaints or problems.     Follow-up  Review of Systems   Constitutional: Negative.    HENT: Negative.     Eyes: Negative.    Respiratory: Negative.     Cardiovascular: Negative.    Gastrointestinal: Negative.    Endocrine: Negative.    Genitourinary: Negative.    Musculoskeletal: Negative.    Integumentary:  Negative.   Allergic/Immunologic: Negative.    Neurological: Negative.    Psychiatric/Behavioral: Negative.            Objective     Physical Exam  Vitals reviewed. Exam conducted with a chaperone present.   HENT:      Head: Normocephalic.   Cardiovascular:      Rate and Rhythm: Normal rate.   Pulmonary:      Effort: Pulmonary effort is normal.   Genitourinary:     General: Normal vulva.      Vagina: Normal.      Cervix: Normal.      Comments: IUD strings noted extending from cervical os  Skin:     General: Skin is warm and dry.   Neurological:      Mental Status: She is alert and oriented to person, place, and time.   Psychiatric:         Mood and Affect: Mood normal.         Behavior: Behavior normal.          Assessment and Plan     1. Encounter for routine checking of intrauterine contraceptive device (IUD)        Call for any problems         Follow up in about 1 year (around 2/15/2025), or if symptoms worsen or fail to improve, for Annual Exam.

## 2024-10-16 ENCOUNTER — TELEPHONE (OUTPATIENT)
Dept: OBSTETRICS AND GYNECOLOGY | Facility: CLINIC | Age: 24
End: 2024-10-16
Payer: COMMERCIAL

## 2024-10-16 NOTE — TELEPHONE ENCOUNTER
----- Message from Ray Le sent at 10/16/2024 12:47 PM CDT -----    ----- Message -----  From: Sonal Barry MA  Sent: 10/16/2024  11:47 AM CDT  To: Wesley Robin Staff    Who Called: Cristinaddison Webber    Caller is requesting assistance/information from provider's office.      Preferred Method of Contact: Phone Call  Patient's Preferred Phone Number on File: [phone]   Best Call Back Number, if different:  Additional Information: Pt states to talked to Sharda Olson at her appt is 10-21 @ 2, nothing in system. Wants to come at later time for blood work she states

## 2024-10-21 ENCOUNTER — OFFICE VISIT (OUTPATIENT)
Dept: OBSTETRICS AND GYNECOLOGY | Facility: CLINIC | Age: 24
End: 2024-10-21
Payer: COMMERCIAL

## 2024-10-21 VITALS
OXYGEN SATURATION: 100 % | SYSTOLIC BLOOD PRESSURE: 110 MMHG | RESPIRATION RATE: 19 BRPM | BODY MASS INDEX: 25.48 KG/M2 | DIASTOLIC BLOOD PRESSURE: 78 MMHG | WEIGHT: 172 LBS | HEIGHT: 69 IN | HEART RATE: 78 BPM

## 2024-10-21 DIAGNOSIS — R63.4 WEIGHT LOSS: ICD-10-CM

## 2024-10-21 DIAGNOSIS — R53.83 FATIGUE, UNSPECIFIED TYPE: Primary | ICD-10-CM

## 2024-10-21 DIAGNOSIS — Z80.3 FAMILY HISTORY OF BREAST CANCER: ICD-10-CM

## 2024-10-21 DIAGNOSIS — F32.A DEPRESSION, UNSPECIFIED DEPRESSION TYPE: ICD-10-CM

## 2024-10-21 PROCEDURE — 3074F SYST BP LT 130 MM HG: CPT | Mod: ,,, | Performed by: ADVANCED PRACTICE MIDWIFE

## 2024-10-21 PROCEDURE — 3078F DIAST BP <80 MM HG: CPT | Mod: ,,, | Performed by: ADVANCED PRACTICE MIDWIFE

## 2024-10-21 PROCEDURE — 99214 OFFICE O/P EST MOD 30 MIN: CPT | Mod: PBBFAC | Performed by: ADVANCED PRACTICE MIDWIFE

## 2024-10-21 PROCEDURE — 99999 PR PBB SHADOW E&M-EST. PATIENT-LVL IV: CPT | Mod: PBBFAC,,, | Performed by: ADVANCED PRACTICE MIDWIFE

## 2024-10-21 PROCEDURE — 3008F BODY MASS INDEX DOCD: CPT | Mod: ,,, | Performed by: ADVANCED PRACTICE MIDWIFE

## 2024-10-21 PROCEDURE — 1159F MED LIST DOCD IN RCRD: CPT | Mod: ,,, | Performed by: ADVANCED PRACTICE MIDWIFE

## 2024-10-21 PROCEDURE — 99214 OFFICE O/P EST MOD 30 MIN: CPT | Mod: S$PBB,,, | Performed by: ADVANCED PRACTICE MIDWIFE

## 2024-10-21 RX ORDER — BUPROPION HYDROCHLORIDE 150 MG/1
150 TABLET ORAL DAILY
Qty: 30 TABLET | Refills: 11 | Status: SHIPPED | OUTPATIENT
Start: 2024-10-21 | End: 2025-10-21

## 2024-10-21 NOTE — PROGRESS NOTES
Subjective     Patient ID: Cristin Webber is a 24 y.o. female.    Chief Complaint: Labs Only (Pt stated thst she is here for BRAC and blood work.)    Has been losing weight since January without changing her diet  States will have extreme weight fluctuations  C/O feeling excessively fatigued.  Goes to bed by 9pm and reports no difficulty sleeping.  C/O daily back pain  Reports worsening acne since the summer  Denies changes in hair growth  Doing well with IUD, no concerns  Wants to have BRCA drawn today    Review of Systems   Constitutional:  Positive for fatigue.   HENT: Negative.     Eyes: Negative.    Respiratory: Negative.     Cardiovascular: Negative.    Gastrointestinal: Negative.    Endocrine: Positive for heat intolerance.   Genitourinary: Negative.  Negative for dysuria, frequency, menstrual irregularity and urgency.   Musculoskeletal: Negative.    Integumentary:  Negative.   Allergic/Immunologic: Negative.    Neurological: Negative.    Psychiatric/Behavioral: Negative.       Past Medical History:   Diagnosis Date    Digestive disorder     Dysautonomia     Hypertension      Past Surgical History:   Procedure Laterality Date     SECTION      GALLBLADDER SURGERY  10/19/2023    HAND SURGERY Right     ROBOT-ASSISTED CHOLECYSTECTOMY USING DA KARIN XI N/A 10/19/2023    Procedure: XI ROBOTIC CHOLECYSTECTOMY;  Surgeon: Luc Yeboah DO;  Location: [facility];  Service: General;  Laterality: N/A;    SHOULDER ARTHROSCOPY Right     TONSILLECTOMY, ADENOIDECTOMY        OB History    Para Term  AB Living   1 1 1     1   SAB IAB Ectopic Multiple Live Births           1      # Outcome Date GA Lbr Domingo/2nd Weight Sex Type Anes PTL Lv   1 Term 23 36w0d  2.92 kg (6 lb 7 oz) M CS-Unspec Spinal  SHERI      Current Outpatient Medications   Medication Instructions    buPROPion (WELLBUTRIN XL) 150 mg, Oral, Daily    busPIRone (BUSPAR) 5 mg, 2 times daily PRN    dextroamphetamine-amphetamine 10 mg Tab 1  tablet, 2 times daily    levonorgestreL (MIRENA) 52 mg IUD 1 each, Intrauterine, Once       Objective   Vitals:    10/21/24 1443   BP: 110/78   Pulse: 78   Resp: 19     Wt Readings from Last 4 Encounters:   10/21/24 78 kg (172 lb)   02/15/24 81.6 kg (180 lb)   01/04/24 80.7 kg (178 lb)   10/19/23 81.6 kg (180 lb)      Physical Exam  Vitals reviewed.   Constitutional:       Appearance: Normal appearance.   HENT:      Head: Normocephalic.   Cardiovascular:      Rate and Rhythm: Normal rate and regular rhythm.   Pulmonary:      Effort: Pulmonary effort is normal.      Breath sounds: Normal breath sounds.   Abdominal:      General: Abdomen is flat.      Palpations: Abdomen is soft.   Musculoskeletal:         General: Normal range of motion.      Cervical back: Normal range of motion.   Skin:     General: Skin is warm and dry.   Neurological:      Mental Status: She is alert and oriented to person, place, and time.   Psychiatric:         Mood and Affect: Mood normal.         Behavior: Behavior normal.        Assessment and Plan   1. Fatigue, unspecified type  -     CBC Auto Differential; Future; Expected date: 10/21/2024  -     Comprehensive Metabolic Panel; Future; Expected date: 10/21/2024  -     Thyroid Panel; Future; Expected date: 10/21/2024  -     JAE EIA w/ Reflex to dsDNA/JAZMINE; Future; Expected date: 10/21/2024  -     Vitamin D; Future; Expected date: 10/21/2024  -     Vitamin B12 & Folate; Future; Expected date: 10/21/2024    2. Weight loss  -     CBC Auto Differential; Future; Expected date: 10/21/2024  -     Comprehensive Metabolic Panel; Future; Expected date: 10/21/2024  -     Thyroid Panel; Future; Expected date: 10/21/2024  -     JAE EIA w/ Reflex to dsDNA/JAZMINE; Future; Expected date: 10/21/2024  -     Vitamin D; Future; Expected date: 10/21/2024  -     Vitamin B12 & Folate; Future; Expected date: 10/21/2024    3. Family history of breast cancer    4. Depression, unspecified depression type  -     buPROPion  (WELLBUTRIN XL) 150 MG TB24 tablet; Take 1 tablet (150 mg total) by mouth once daily.  Dispense: 30 tablet; Refill: 11    BRCA drawn today  Labs  Discussed healthy diet     Follow up if symptoms worsen or fail to improve.

## 2025-01-31 NOTE — TELEPHONE ENCOUNTER
----- Message from Med Assistant Pruitt sent at 1/31/2025 10:24 AM CST -----  Who Called: Cristin Webber    Caller is requesting assistance/information from provider's office.      Preferred Method of Contact: Phone Call  Patient's Preferred Phone Number on File: [phone]   Best Call Back Number, if different:  Additional Information: pt needs b/c called in because when she just changed tampon her iud came out with it

## 2025-01-31 NOTE — TELEPHONE ENCOUNTER
Patient states that her IUD come out while changing tampon. She states that with work schedule she does not want to come in for visit and have one put back in wants to go back on OC's . Jazmín was what she was on before getting IUD and would like to do that again. Discussed with patient that Sharda is not here but she will send it in on Monday. Patient voices understanding.

## 2025-02-03 RX ORDER — DROSPIRENONE AND ETHINYL ESTRADIOL 0.02-3(28)
1 KIT ORAL DAILY
Qty: 90 TABLET | Refills: 3 | Status: SHIPPED | OUTPATIENT
Start: 2025-02-03 | End: 2026-02-03

## 2025-04-10 NOTE — PATIENT INSTRUCTIONS
Patient Education       Sinusitis in Adults   The Basics   Written by the doctors and editors at [facility]   What is sinusitis? -- Sinusitis is a condition that can cause a stuffy nose, pain in the face, and discharge (mucus) from the nose. The sinuses are hollow areas in the bones of the face (figure 1). They have a thin lining that normally makes a small amount of mucus. When this lining gets irritated or infected, it swells and makes extra mucus. This causes symptoms.  Sinusitis can occur when a person gets sick with a cold. The germs causing the cold can also infect the sinuses. Many times, a person feels like their cold is getting better. But then they get sinusitis and begin to feel sick again.  What are the symptoms of sinusitis? -- Common symptoms of sinusitis include:  Stuffy or blocked nose  Thick white, yellow, or green discharge from the nose  Pain in the teeth  Pain or pressure in the face - This often feels worse when a person bends forward.  People with sinusitis can also have other symptoms that include:  Fever  Cough  Trouble smelling  Ear pressure or fullness  Headache  Bad breath  Feeling tired  Most of the time, symptoms start to improve in 7 to 10 days.  Should I see a doctor or nurse? -- See your doctor or nurse if your symptoms last more than 10 days, or if your symptoms first get better but then get worse.  Rarely, sinusitis can lead to serious problems. See your doctor or nurse right away (do not wait 10 days) if you have:  Fever higher than 102°F (38.9°C)  Sudden and severe pain in the face and head  Trouble seeing or seeing double  Trouble thinking clearly  Swelling or redness around one or both eyes  A stiff neck  Is there anything I can do on my own to feel better? -- Yes. To reduce your symptoms, you can:  Take an over-the-counter pain reliever to reduce the pain  Rinse your nose and sinuses with salt water a few times a day - Ask your doctor or nurse about the best way to do this.  Your  "doctor might also prescribe a steroid nose spray to reduce the swelling in your nose. (These kinds of steroid nose sprays are safe to take, and do not contain the same steroids that some athletes take illegally.)  How is sinusitis treated? -- Most of the time, sinusitis does not need to be treated with antibiotic medicines. This is because most sinusitis is caused by viruses, not bacteria, and antibiotics do not kill viruses. In fact, even sinusitis caused by bacteria will usually get better on its own without antibiotics.   Some people with sinusitis do need treatment with antibiotics. If your symptoms have not improved after 10 days, ask your doctor if you should take antibiotics. Your doctor might recommend that you wait 1 more week to see if your symptoms improve. But if you have symptoms such as a fever or a lot of pain, they might prescribe antibiotics. It is important to follow your doctor's instructions about taking your antibiotics.  What if my symptoms do not get better? -- If your symptoms do not get better, talk with your doctor or nurse. They might order tests to figure out why you still have symptoms. These can include:  CT scan or other imaging tests - Imaging tests create pictures of the inside of the body.  A test to look inside the sinuses - For this test, a doctor puts a thin tube with a camera on the end into the nose and up into the sinuses.  Some people get a lot of sinus infections or have symptoms that last at least 3 months. These people can have a different type of sinusitis called "chronic sinusitis." Chronic sinusitis can be caused by different things. For example, some people have growths inside their nose or sinuses that are called "polyps." Other people have allergies that cause their symptoms.  Chronic sinusitis can be treated in different ways. If you have chronic sinusitis, talk with your doctor about which treatments are right for you.  All topics are updated as new evidence " becomes available and our peer review process is complete.  This topic retrieved from [employer] on: Sep 21, 2021.  Topic 24120 Version 17.0  Release: 29.4.2 - C29.263  © 2021 UpToDate, Inc. and/or its affiliates. All rights reserved.  figure 1: Sinuses of the face     This drawing shows the sinuses of the face, from the side and front views.  Graphic 273271 Version 1.0    Consumer Information Use and Disclaimer   This information is not specific medical advice and does not replace information you receive from your health care provider. This is only a brief summary of general information. It does NOT include all information about conditions, illnesses, injuries, tests, procedures, treatments, therapies, discharge instructions or life-style choices that may apply to you. You must talk with your health care provider for complete information about your health and treatment options. This information should not be used to decide whether or not to accept your health care provider's advice, instructions or recommendations. Only your health care provider has the knowledge and training to provide advice that is right for you. The use of this information is governed by the [employer] End User License Agreement, available at [email or web address]/valerie.The use of [employer] content is governed by the [employer] Terms of Use. ©2021 UpToDate, Inc. All rights reserved.  Copyright   © 2021 UpToDate, Inc. and/or its affiliates. All rights reserved.     mammo    Past Medical History:      Diagnosis Date    Anxiety     Depression     Headache     Hypothyroidism due to Hashimoto's thyroiditis 10/19/2021    Irritable bowel syndrome with diarrhea 08/17/2021    Miscarriage 2018    Plantar fasciitis of right foot 06/23/2022    Sprain of ankle 06/09/2014    Unspecified lump in the right breast, upper outer quadrant 08/02/2022    Weight gain, abnormal 08/17/2021       Allergies:    Patient has no known allergies.    Social History:   Social History     Tobacco Use    Smoking status: Never    Smokeless tobacco: Never   Substance Use Topics    Alcohol use: No        Reviewof Systems:   Review of Systems Negative unless otherwise stated in HPI.    Physical Exam   Vitals: /74   Pulse 78   Temp 98 °F (36.7 °C) (Infrared)   Resp 16   Ht 1.651 m (5' 5\")   Wt 84.8 kg (187 lb)   LMP 02/25/2025   SpO2 100%   BMI 31.12 kg/m²        Physical Exam  Constitutional:       Appearance: Normal appearance.   Eyes:      Extraocular Movements: Extraocular movements intact.      Conjunctiva/sclera: Conjunctivae normal.   Cardiovascular:      Rate and Rhythm: Normal rate and regular rhythm.      Heart sounds: No murmur heard.     No friction rub. No gallop.   Pulmonary:      Effort: Pulmonary effort is normal.      Breath sounds: Normal breath sounds.   Abdominal:      General: Abdomen is flat.      Palpations: Abdomen is soft.   Musculoskeletal:         General: No swelling or tenderness.      Cervical back: Normal range of motion and neck supple.      Right lower leg: No edema.      Left lower leg: No edema.      Comments: ROM L hip normal, pain with cross body adduction   Skin:     General: Skin is warm and dry.   Neurological:      Mental Status: She is alert and oriented to person, place, and time. Mental status is at baseline.   Psychiatric:         Mood and Affect: Mood normal.         Thought Content: Thought content normal.          Results  - Labs:    - Blood work:

## 2025-06-24 ENCOUNTER — TELEPHONE (OUTPATIENT)
Dept: OBSTETRICS AND GYNECOLOGY | Facility: CLINIC | Age: 25
End: 2025-06-24
Payer: COMMERCIAL

## 2025-07-22 ENCOUNTER — OFFICE VISIT (OUTPATIENT)
Dept: OBSTETRICS AND GYNECOLOGY | Facility: CLINIC | Age: 25
End: 2025-07-22
Payer: COMMERCIAL

## 2025-07-22 VITALS
DIASTOLIC BLOOD PRESSURE: 66 MMHG | BODY MASS INDEX: 25.83 KG/M2 | HEART RATE: 66 BPM | HEIGHT: 69 IN | SYSTOLIC BLOOD PRESSURE: 108 MMHG | WEIGHT: 174.38 LBS

## 2025-07-22 DIAGNOSIS — N92.0 MENORRHAGIA WITH REGULAR CYCLE: Primary | ICD-10-CM

## 2025-07-22 DIAGNOSIS — Z30.09 GENERAL COUNSELING AND ADVICE ON FEMALE CONTRACEPTION: ICD-10-CM

## 2025-07-22 DIAGNOSIS — Z12.4 SCREENING FOR MALIGNANT NEOPLASM OF THE CERVIX: ICD-10-CM

## 2025-07-22 DIAGNOSIS — Z01.411 ENCOUNTER FOR GYNECOLOGICAL EXAMINATION (GENERAL) (ROUTINE) WITH ABNORMAL FINDINGS: ICD-10-CM

## 2025-07-22 PROCEDURE — 99213 OFFICE O/P EST LOW 20 MIN: CPT | Mod: PBBFAC | Performed by: OBSTETRICS & GYNECOLOGY

## 2025-07-22 PROCEDURE — 99999 PR PBB SHADOW E&M-EST. PATIENT-LVL III: CPT | Mod: PBBFAC,,, | Performed by: OBSTETRICS & GYNECOLOGY

## 2025-07-22 PROCEDURE — 88141 CYTOPATH C/V INTERPRET: CPT | Mod: ,,, | Performed by: PATHOLOGY

## 2025-07-22 PROCEDURE — 88142 CYTOPATH C/V THIN LAYER: CPT | Mod: TC,GCY | Performed by: OBSTETRICS & GYNECOLOGY

## 2025-07-22 RX ORDER — FLUDROCORTISONE ACETATE 0.1 MG/1
100 TABLET ORAL
COMMUNITY
Start: 2025-06-23

## 2025-07-22 RX ORDER — METOPROLOL SUCCINATE 25 MG/1
TABLET, EXTENDED RELEASE ORAL
COMMUNITY
Start: 2025-07-10

## 2025-07-22 NOTE — PROGRESS NOTES
Annual Gynecologic Exam    Assessment/Plan:  25 y.o.  presenting for her annual exam:    Problem List Items Addressed This Visit    None  Visit Diagnoses         Menorrhagia with regular cycle    -  Primary    Relevant Orders    von Willebrand Antigen              Health Maintenance:    Birth control: OCPs  Pregnancy plans: none   Safe relationship: ***  PCP: ANISA White     Screening:  Last pap smear: 2024- negative  History of abnormal pap smears: no      CC:   Chief Complaint   Patient presents with    Well Woman       HPI:  25 y.o.  presents for her gynecologic annual exam. Patient states no concerns at present. Using Jazmín but still having heavy cycles. Cycles started when she was 10 and has always been heavy. Uses the Ultra tampon and can soak one in 30 minutes. Discussed her taking the OCPs straight through to skip cycles. States the IUD was great but came out in January when she removed her tampon.    Review of Systems: The following ROS was otherwise negative, except as noted in the HPI:  constitutional, HEENT, respiratory, cardiovascular, gastrointestinal, genitourinary, skin, musculoskeletal, neurological, psych    Primary Care Physician: Eagle Toledo IV, MD    Obstetric History  OB History    Para Term  AB Living   1 1 1   1   SAB IAB Ectopic Multiple Live Births       1      # Outcome Date GA Lbr Domingo/2nd Weight Sex Type Anes PTL Lv   1 Term 23 36w0d  2.92 kg (6 lb 7 oz) M CS-Unspec Spinal  SHERI       Gynecologic History       Social History     Substance and Sexual Activity   Sexual Activity Not Currently    Partners: Male    Birth control/protection: OCP                   Menstrual History:   LMP: ***    Age of Menarche: ***   Menstrual Period: {Desc; regular/irre}   Interval Between Menses: ***   Duration of Menses: ***    Menstrual Flow: ***   Bleeding between menses: ***    Sexual History:    Contraception: see above   Currently {is/is  not:62539} sexually active   {Denies/Admits to:24308} history of STIs   {Denies/Admits to:39962} sexual problems    Pap History:   History of abnormal pap smears: see above   Last pap: see above    Medical History:  Past Medical History:   Diagnosis Date    Digestive disorder     Dysautonomia     Hypertension        Medications:  Medication List with Changes/Refills   Current Medications    BUPROPION (WELLBUTRIN XL) 150 MG TB24 TABLET    Take 1 tablet (150 mg total) by mouth once daily.    BUSPIRONE (BUSPAR) 5 MG TAB    Take 5 mg by mouth 2 (two) times daily as needed.    DEXTROAMPHETAMINE-AMPHETAMINE 10 MG TAB    Take 1 tablet by mouth 2 (two) times daily.    DROSPIRENONE-ETHINYL ESTRADIOL (CLARA) 3-0.02 MG PER TABLET    Take 1 tablet by mouth once daily.    FLUDROCORTISONE (FLORINEF) 0.1 MG TAB    Take 100 mcg by mouth.    LEVONORGESTREL (MIRENA) 52 MG IUD    1 each by Intrauterine route once.    METOPROLOL SUCCINATE (TOPROL-XL) 25 MG 24 HR TABLET           Surgical History:  Past Surgical History:   Procedure Laterality Date     SECTION      GALLBLADDER SURGERY  10/19/2023    HAND SURGERY Right     ROBOT-ASSISTED CHOLECYSTECTOMY USING DA KARIN XI N/A 10/19/2023    Procedure: XI ROBOTIC CHOLECYSTECTOMY;  Surgeon: Luc Yeboah DO;  Location: [facility];  Service: General;  Laterality: N/A;    SHOULDER ARTHROSCOPY Right     TONSILLECTOMY, ADENOIDECTOMY         Allergies:  Review of patient's allergies indicates:   Allergen Reactions    Adhesive tape-silicones     Oxycodone Nausea And Vomiting       Family History:  Family History   Problem Relation Name Age of Onset    Rheum arthritis Mother      Diabetes Mother      Hypertension Mother      Osteoarthritis Mother      Hypertension Father      Hyperlipidemia Father      Breast cancer Paternal Grandmother  60    Melanoma Paternal Grandmother      Prostate cancer Paternal Grandfather      Breast cancer Maternal Aunt  50    Colon cancer Neg Hx      Ovarian  "cancer Neg Hx         Social History:  Social History     Socioeconomic History    Marital status:     Number of children: 1   Tobacco Use    Smoking status: Never    Smokeless tobacco: Never   Substance and Sexual Activity    Alcohol use: Yes    Drug use: Never    Sexual activity: Not Currently     Partners: Male     Birth control/protection: OCP       Objective:  Body mass index is 25.75 kg/m².    /66 (BP Location: Right arm, Patient Position: Sitting)   Pulse 66   Ht 5' 9" (1.753 m)   Wt 79.1 kg (174 lb 6.4 oz)   LMP 07/13/2025   BMI 25.75 kg/m²     General: Alert, well appearing, no acute distress  Head: Normocephalic, atraumatic  Breasts: Symmetric, non-tender to palpation, no skin changes, palpable axillary lymph nodes or masses noted  Lungs: Unlabored respirations. Clear to auscultation bilaterally.  Cardiovascular: Regular rate and rhythm.   Abdomen: Soft, nontender, nondistended   Pelvic: Exam chaperoned by female assistant.    External: normal female genitalia, no masses or lesions   Vagina: moist, pink mucosa with rugae, physiologic discharge  Cervix: no masses or lesions, nontender  Uterus: approx *** weeks, mobile, midline, nontender  Adnexa: no masses or fullness, nontender  Rectovaginal: deferred  Extremities: No redness or tenderness  Skin: Well perfused, normal coloration and turgor, no lesions or rashes visualized  Neuro: Alert, oriented, normal speech, no focal deficits, moves extremities appropriately  Psych: Normal mood and behavior.     Ankita Nazario MD     " appropriately  Psych: Normal mood and behavior.     Ankita Nazario MD

## 2025-07-24 ENCOUNTER — TELEPHONE (OUTPATIENT)
Dept: OBSTETRICS AND GYNECOLOGY | Facility: CLINIC | Age: 25
End: 2025-07-24
Payer: COMMERCIAL

## 2025-07-24 LAB
GH SERPL-MCNC: ABNORMAL NG/ML
INSULIN SERPL-ACNC: ABNORMAL U[IU]/ML
LAB AP CLINICAL INFORMATION: ABNORMAL
LAB AP GYN INTERPRETATION: ABNORMAL
LAB AP PAP DISCLAIMER COMMENTS: ABNORMAL
RENIN PLAS-CCNC: ABNORMAL NG/ML/H

## 2025-07-24 NOTE — TELEPHONE ENCOUNTER
"Spoke with pt and explained Dr. Nazario had not reviewed results yet and that I had no idea how to read her bloodwork. See note.     Explained the pap did come back LSIL, but that I was not sure if the HPV would be ran, if she would advise we wait a year and repeat it or if she would recommend a secondary procedure. Went over what the procedure entailed, if that was the case. Also explained that we could "schedule" the IUD insertion to see if insurance would cover the insertion. Patient will wait until Dr. aNzario reviews the results and expects me to call.  "

## 2025-07-30 ENCOUNTER — PATIENT MESSAGE (OUTPATIENT)
Dept: OBSTETRICS AND GYNECOLOGY | Facility: CLINIC | Age: 25
End: 2025-07-30
Payer: COMMERCIAL

## 2025-08-07 ENCOUNTER — PATIENT MESSAGE (OUTPATIENT)
Dept: OBSTETRICS AND GYNECOLOGY | Facility: CLINIC | Age: 25
End: 2025-08-07
Payer: COMMERCIAL

## 2025-09-02 DIAGNOSIS — Z32.01 POSITIVE PREGNANCY TEST: Primary | ICD-10-CM

## (undated) DEVICE — DRAPE ARM DAVINCI XI

## (undated) DEVICE — CLIP HEMO-LOK ML

## (undated) DEVICE — CANNULA REDUCER 12-8MM

## (undated) DEVICE — GAUZE SPONGE XRAY 4X4

## (undated) DEVICE — EVACUATOR KIT SMOKE PLUME AWAY

## (undated) DEVICE — GLOVE 8.5 PROTEXIS PI BLUE

## (undated) DEVICE — CANNULA SEAL 12MM

## (undated) DEVICE — GLOVE PROTEXIS PI SYN SURG 6.5

## (undated) DEVICE — ADHESIVE MASTISOL VIAL 48/BX

## (undated) DEVICE — TROCAR ENDO Z THREAD KII 5X100

## (undated) DEVICE — APPLIER MEDIUM LARGE CLIP

## (undated) DEVICE — GLOVE PROTEXIS PI SYN SURG 7

## (undated) DEVICE — GOWN NONREINF SET-IN SLV 2XL

## (undated) DEVICE — WARMER BLUE HEAT SCOPE 3-12MM

## (undated) DEVICE — HOOK PERM MONOPOLAR CAUTERY

## (undated) DEVICE — SUT 0 VICRYL / UR6 (J603)

## (undated) DEVICE — KIT PROCEDURE STER INLET CLOSU

## (undated) DEVICE — SEAL UNIVERSAL 5MM-8MM XI

## (undated) DEVICE — OBTURATOR BLADELESS 8MM XI CLR

## (undated) DEVICE — SUT MONOCRYL 4-0 PS-2

## (undated) DEVICE — Device

## (undated) DEVICE — NDL INSUFFLATION VERRES 120MM

## (undated) DEVICE — TAPE DRAPE STRIP CLSR 4.5X24IN

## (undated) DEVICE — GLOVE PROTEXIS PI SYN SURG 8.0

## (undated) DEVICE — STRIP MEDI WND CLSR 1/2X4IN

## (undated) DEVICE — FORCEP FENESTRATED BIPOLAR

## (undated) DEVICE — BAG TISS RETRV MONARCH 10MM